# Patient Record
Sex: FEMALE | Race: OTHER | Employment: UNEMPLOYED | URBAN - METROPOLITAN AREA
[De-identification: names, ages, dates, MRNs, and addresses within clinical notes are randomized per-mention and may not be internally consistent; named-entity substitution may affect disease eponyms.]

---

## 2023-12-28 ENCOUNTER — ORDER TRANSCRIPTION (OUTPATIENT)
Dept: PHYSICAL THERAPY | Facility: HOSPITAL | Age: 63
End: 2023-12-28

## 2023-12-28 DIAGNOSIS — I69.30 HISTORY OF ISCHEMIC CEREBROVASCULAR ACCIDENT WITH RESIDUAL DEFICIT: Primary | ICD-10-CM

## 2023-12-28 DIAGNOSIS — I69.322 DYSARTHRIA AS LATE EFFECT OF CEREBELLAR CEREBROVASCULAR ACCIDENT (CVA): ICD-10-CM

## 2023-12-28 DIAGNOSIS — I69.30 HISTORY OF ISCHEMIC CEREBROVASCULAR ACCIDENT WITH RESIDUAL DEFICIT: ICD-10-CM

## 2023-12-28 DIAGNOSIS — R29.898 RIGHT ARM WEAKNESS: Primary | ICD-10-CM

## 2023-12-28 DIAGNOSIS — I69.322 DYSARTHRIA AS LATE EFFECT OF CEREBROVASCULAR ACCIDENT (CVA): ICD-10-CM

## 2024-01-03 ENCOUNTER — APPOINTMENT (OUTPATIENT)
Dept: CT IMAGING | Facility: HOSPITAL | Age: 64
End: 2024-01-03
Attending: NURSE PRACTITIONER
Payer: COMMERCIAL

## 2024-01-03 ENCOUNTER — HOSPITAL ENCOUNTER (OUTPATIENT)
Facility: HOSPITAL | Age: 64
Setting detail: OBSERVATION
Discharge: HOME | End: 2024-01-05
Attending: STUDENT IN AN ORGANIZED HEALTH CARE EDUCATION/TRAINING PROGRAM | Admitting: STUDENT IN AN ORGANIZED HEALTH CARE EDUCATION/TRAINING PROGRAM
Payer: COMMERCIAL

## 2024-01-03 DIAGNOSIS — R11.10 INTRACTABLE VOMITING: Primary | ICD-10-CM

## 2024-01-03 LAB
ALBUMIN SERPL-MCNC: 5 G/DL (ref 3.2–4.8)
ALBUMIN/GLOB SERPL: 1.2 {RATIO} (ref 1–2)
ALP LIVER SERPL-CCNC: 62 U/L
ALT SERPL-CCNC: 10 U/L
ANION GAP SERPL CALC-SCNC: 6 MMOL/L (ref 0–18)
AST SERPL-CCNC: 21 U/L (ref ?–34)
BASOPHILS # BLD AUTO: 0.03 X10(3) UL (ref 0–0.2)
BASOPHILS NFR BLD AUTO: 0.5 %
BILIRUB SERPL-MCNC: 0.6 MG/DL (ref 0.2–1.1)
BILIRUB UR QL: NEGATIVE
BUN BLD-MCNC: 17 MG/DL (ref 9–23)
BUN/CREAT SERPL: 23.3 (ref 10–20)
CALCIUM BLD-MCNC: 9.9 MG/DL (ref 8.7–10.4)
CHLORIDE SERPL-SCNC: 103 MMOL/L (ref 98–112)
CO2 SERPL-SCNC: 32 MMOL/L (ref 21–32)
CREAT BLD-MCNC: 0.73 MG/DL
DEPRECATED RDW RBC AUTO: 38.9 FL (ref 35.1–46.3)
EGFRCR SERPLBLD CKD-EPI 2021: 92 ML/MIN/1.73M2 (ref 60–?)
EOSINOPHIL # BLD AUTO: 0.07 X10(3) UL (ref 0–0.7)
EOSINOPHIL NFR BLD AUTO: 1.2 %
ERYTHROCYTE [DISTWIDTH] IN BLOOD BY AUTOMATED COUNT: 11.6 % (ref 11–15)
FLUAV + FLUBV RNA SPEC NAA+PROBE: NEGATIVE
FLUAV + FLUBV RNA SPEC NAA+PROBE: NEGATIVE
GLOBULIN PLAS-MCNC: 4.1 G/DL (ref 2.8–4.4)
GLUCOSE BLD-MCNC: 83 MG/DL (ref 70–99)
GLUCOSE UR-MCNC: >1000 MG/DL
HCT VFR BLD AUTO: 35.6 %
HGB BLD-MCNC: 12.2 G/DL
HGB UR QL STRIP.AUTO: NEGATIVE
IMM GRANULOCYTES # BLD AUTO: 0.02 X10(3) UL (ref 0–1)
IMM GRANULOCYTES NFR BLD: 0.3 %
KETONES UR-MCNC: 60 MG/DL
LEUKOCYTE ESTERASE UR QL STRIP.AUTO: 500
LIPASE SERPL-CCNC: 40 U/L (ref 13–75)
LYMPHOCYTES # BLD AUTO: 1.73 X10(3) UL (ref 1–4)
LYMPHOCYTES NFR BLD AUTO: 29.6 %
MCH RBC QN AUTO: 31.4 PG (ref 26–34)
MCHC RBC AUTO-ENTMCNC: 34.3 G/DL (ref 31–37)
MCV RBC AUTO: 91.8 FL
MONOCYTES # BLD AUTO: 0.46 X10(3) UL (ref 0.1–1)
MONOCYTES NFR BLD AUTO: 7.9 %
NEUTROPHILS # BLD AUTO: 3.54 X10 (3) UL (ref 1.5–7.7)
NEUTROPHILS # BLD AUTO: 3.54 X10(3) UL (ref 1.5–7.7)
NEUTROPHILS NFR BLD AUTO: 60.5 %
NITRITE UR QL STRIP.AUTO: NEGATIVE
OSMOLALITY SERPL CALC.SUM OF ELEC: 293 MOSM/KG (ref 275–295)
PH UR: 6.5 [PH] (ref 5–8)
PLATELET # BLD AUTO: 324 10(3)UL (ref 150–450)
POTASSIUM SERPL-SCNC: 3.1 MMOL/L (ref 3.5–5.1)
PROT SERPL-MCNC: 9.1 G/DL (ref 5.7–8.2)
PROT UR-MCNC: NEGATIVE MG/DL
RBC # BLD AUTO: 3.88 X10(6)UL
RSV RNA SPEC NAA+PROBE: NEGATIVE
SARS-COV-2 RNA RESP QL NAA+PROBE: NOT DETECTED
SODIUM SERPL-SCNC: 141 MMOL/L (ref 136–145)
SP GR UR STRIP: >1.03 (ref 1–1.03)
UROBILINOGEN UR STRIP-ACNC: NORMAL
WBC # BLD AUTO: 5.9 X10(3) UL (ref 4–11)

## 2024-01-03 PROCEDURE — 74177 CT ABD & PELVIS W/CONTRAST: CPT | Performed by: NURSE PRACTITIONER

## 2024-01-03 PROCEDURE — 99223 1ST HOSP IP/OBS HIGH 75: CPT | Performed by: HOSPITALIST

## 2024-01-03 RX ORDER — ONDANSETRON 2 MG/ML
4 INJECTION INTRAMUSCULAR; INTRAVENOUS ONCE
Status: COMPLETED | OUTPATIENT
Start: 2024-01-03 | End: 2024-01-03

## 2024-01-03 RX ORDER — ONDANSETRON 2 MG/ML
INJECTION INTRAMUSCULAR; INTRAVENOUS
Status: COMPLETED
Start: 2024-01-03 | End: 2024-01-03

## 2024-01-03 RX ORDER — MORPHINE SULFATE 4 MG/ML
4 INJECTION, SOLUTION INTRAMUSCULAR; INTRAVENOUS ONCE
Status: COMPLETED | OUTPATIENT
Start: 2024-01-03 | End: 2024-01-03

## 2024-01-03 RX ORDER — KETOROLAC TROMETHAMINE 15 MG/ML
15 INJECTION, SOLUTION INTRAMUSCULAR; INTRAVENOUS ONCE
Status: COMPLETED | OUTPATIENT
Start: 2024-01-03 | End: 2024-01-03

## 2024-01-04 LAB
ANION GAP SERPL CALC-SCNC: 7 MMOL/L (ref 0–18)
ATRIAL RATE: 54 BPM
BASOPHILS # BLD AUTO: 0.02 X10(3) UL (ref 0–0.2)
BASOPHILS NFR BLD AUTO: 0.3 %
BUN BLD-MCNC: 14 MG/DL (ref 9–23)
BUN/CREAT SERPL: 20.9 (ref 10–20)
CALCIUM BLD-MCNC: 8.8 MG/DL (ref 8.7–10.4)
CHLORIDE SERPL-SCNC: 108 MMOL/L (ref 98–112)
CO2 SERPL-SCNC: 25 MMOL/L (ref 21–32)
CREAT BLD-MCNC: 0.67 MG/DL
DEPRECATED RDW RBC AUTO: 39.4 FL (ref 35.1–46.3)
EGFRCR SERPLBLD CKD-EPI 2021: 98 ML/MIN/1.73M2 (ref 60–?)
EOSINOPHIL # BLD AUTO: 0 X10(3) UL (ref 0–0.7)
EOSINOPHIL NFR BLD AUTO: 0 %
ERYTHROCYTE [DISTWIDTH] IN BLOOD BY AUTOMATED COUNT: 11.5 % (ref 11–15)
EST. AVERAGE GLUCOSE BLD GHB EST-MCNC: 148 MG/DL (ref 68–126)
GLUCOSE BLD-MCNC: 114 MG/DL (ref 70–99)
GLUCOSE BLDC GLUCOMTR-MCNC: 102 MG/DL (ref 70–99)
GLUCOSE BLDC GLUCOMTR-MCNC: 138 MG/DL (ref 70–99)
GLUCOSE BLDC GLUCOMTR-MCNC: 155 MG/DL (ref 70–99)
GLUCOSE BLDC GLUCOMTR-MCNC: 211 MG/DL (ref 70–99)
GLUCOSE BLDC GLUCOMTR-MCNC: 70 MG/DL (ref 70–99)
HBA1C MFR BLD: 6.8 % (ref ?–5.7)
HCT VFR BLD AUTO: 33.8 %
HGB BLD-MCNC: 11 G/DL
IMM GRANULOCYTES # BLD AUTO: 0.03 X10(3) UL (ref 0–1)
IMM GRANULOCYTES NFR BLD: 0.4 %
LIPASE SERPL-CCNC: 34 U/L (ref 12–53)
LYMPHOCYTES # BLD AUTO: 1.01 X10(3) UL (ref 1–4)
LYMPHOCYTES NFR BLD AUTO: 14.5 %
MCH RBC QN AUTO: 30.7 PG (ref 26–34)
MCHC RBC AUTO-ENTMCNC: 32.5 G/DL (ref 31–37)
MCV RBC AUTO: 94.4 FL
MONOCYTES # BLD AUTO: 0.27 X10(3) UL (ref 0.1–1)
MONOCYTES NFR BLD AUTO: 3.9 %
NEUTROPHILS # BLD AUTO: 5.65 X10 (3) UL (ref 1.5–7.7)
NEUTROPHILS # BLD AUTO: 5.65 X10(3) UL (ref 1.5–7.7)
NEUTROPHILS NFR BLD AUTO: 80.9 %
OSMOLALITY SERPL CALC.SUM OF ELEC: 291 MOSM/KG (ref 275–295)
P AXIS: 42 DEGREES
P-R INTERVAL: 224 MS
PLATELET # BLD AUTO: 258 10(3)UL (ref 150–450)
POTASSIUM SERPL-SCNC: 3.5 MMOL/L (ref 3.5–5.1)
Q-T INTERVAL: 490 MS
QRS DURATION: 148 MS
QTC CALCULATION (BEZET): 464 MS
R AXIS: -61 DEGREES
RBC # BLD AUTO: 3.58 X10(6)UL
SODIUM SERPL-SCNC: 140 MMOL/L (ref 136–145)
T AXIS: 24 DEGREES
VENTRICULAR RATE: 54 BPM
WBC # BLD AUTO: 7 X10(3) UL (ref 4–11)

## 2024-01-04 PROCEDURE — 99233 SBSQ HOSP IP/OBS HIGH 50: CPT | Performed by: HOSPITALIST

## 2024-01-04 RX ORDER — ATORVASTATIN CALCIUM 40 MG/1
40 TABLET, FILM COATED ORAL NIGHTLY
Status: DISCONTINUED | OUTPATIENT
Start: 2024-01-05 | End: 2024-01-05

## 2024-01-04 RX ORDER — DEXTROSE MONOHYDRATE 25 G/50ML
50 INJECTION, SOLUTION INTRAVENOUS
Status: DISCONTINUED | OUTPATIENT
Start: 2024-01-04 | End: 2024-01-05

## 2024-01-04 RX ORDER — NICOTINE POLACRILEX 4 MG
30 LOZENGE BUCCAL
Status: DISCONTINUED | OUTPATIENT
Start: 2024-01-04 | End: 2024-01-05

## 2024-01-04 RX ORDER — MORPHINE SULFATE 2 MG/ML
2 INJECTION, SOLUTION INTRAMUSCULAR; INTRAVENOUS EVERY 2 HOUR PRN
Status: DISCONTINUED | OUTPATIENT
Start: 2024-01-04 | End: 2024-01-05

## 2024-01-04 RX ORDER — ASPIRIN 81 MG/1
81 TABLET ORAL DAILY
Status: DISCONTINUED | OUTPATIENT
Start: 2024-01-04 | End: 2024-01-05

## 2024-01-04 RX ORDER — ONDANSETRON 2 MG/ML
4 INJECTION INTRAMUSCULAR; INTRAVENOUS EVERY 6 HOURS PRN
Status: DISCONTINUED | OUTPATIENT
Start: 2024-01-04 | End: 2024-01-05

## 2024-01-04 RX ORDER — NICOTINE POLACRILEX 4 MG
15 LOZENGE BUCCAL
Status: DISCONTINUED | OUTPATIENT
Start: 2024-01-04 | End: 2024-01-05

## 2024-01-04 RX ORDER — HEPARIN SODIUM 5000 [USP'U]/ML
5000 INJECTION, SOLUTION INTRAVENOUS; SUBCUTANEOUS EVERY 12 HOURS
Status: DISCONTINUED | OUTPATIENT
Start: 2024-01-04 | End: 2024-01-05

## 2024-01-04 RX ORDER — MORPHINE SULFATE 4 MG/ML
4 INJECTION, SOLUTION INTRAMUSCULAR; INTRAVENOUS EVERY 2 HOUR PRN
Status: DISCONTINUED | OUTPATIENT
Start: 2024-01-04 | End: 2024-01-05

## 2024-01-04 RX ORDER — ZOLPIDEM TARTRATE 5 MG/1
5 TABLET ORAL NIGHTLY PRN
Status: DISCONTINUED | OUTPATIENT
Start: 2024-01-04 | End: 2024-01-05

## 2024-01-04 RX ORDER — SODIUM CHLORIDE AND POTASSIUM CHLORIDE 150; 900 MG/100ML; MG/100ML
INJECTION, SOLUTION INTRAVENOUS CONTINUOUS
Status: DISCONTINUED | OUTPATIENT
Start: 2024-01-04 | End: 2024-01-05

## 2024-01-04 RX ORDER — CLOPIDOGREL BISULFATE 75 MG/1
75 TABLET ORAL DAILY
Status: DISCONTINUED | OUTPATIENT
Start: 2024-01-04 | End: 2024-01-05

## 2024-01-04 RX ORDER — LOSARTAN POTASSIUM 100 MG/1
100 TABLET ORAL DAILY
Status: DISCONTINUED | OUTPATIENT
Start: 2024-01-04 | End: 2024-01-05

## 2024-01-04 RX ORDER — DEXTROSE MONOHYDRATE, SODIUM CHLORIDE, AND POTASSIUM CHLORIDE 50; 1.49; 4.5 G/1000ML; G/1000ML; G/1000ML
INJECTION, SOLUTION INTRAVENOUS
Status: DISCONTINUED
Start: 2024-01-04 | End: 2024-01-04

## 2024-01-04 RX ORDER — METOCLOPRAMIDE HYDROCHLORIDE 5 MG/ML
10 INJECTION INTRAMUSCULAR; INTRAVENOUS EVERY 6 HOURS PRN
Status: DISCONTINUED | OUTPATIENT
Start: 2024-01-04 | End: 2024-01-05

## 2024-01-04 RX ORDER — HALOPERIDOL 5 MG/ML
2 INJECTION INTRAMUSCULAR EVERY 6 HOURS PRN
Status: DISCONTINUED | OUTPATIENT
Start: 2024-01-04 | End: 2024-01-05

## 2024-01-04 RX ORDER — ACETAMINOPHEN 325 MG/1
650 TABLET ORAL EVERY 6 HOURS PRN
Status: DISCONTINUED | OUTPATIENT
Start: 2024-01-04 | End: 2024-01-05

## 2024-01-04 RX ORDER — AMLODIPINE BESYLATE 10 MG/1
10 TABLET ORAL DAILY
Status: DISCONTINUED | OUTPATIENT
Start: 2024-01-04 | End: 2024-01-05

## 2024-01-04 RX ORDER — MAGNESIUM HYDROXIDE/ALUMINUM HYDROXICE/SIMETHICONE 120; 1200; 1200 MG/30ML; MG/30ML; MG/30ML
30 SUSPENSION ORAL 4 TIMES DAILY PRN
Status: DISCONTINUED | OUTPATIENT
Start: 2024-01-04 | End: 2024-01-05

## 2024-01-04 RX ORDER — SODIUM CHLORIDE 9 MG/ML
INJECTION, SOLUTION INTRAVENOUS CONTINUOUS
Status: ACTIVE | OUTPATIENT
Start: 2024-01-04 | End: 2024-01-04

## 2024-01-04 NOTE — ED QUICK NOTES
Orders for admission, patient is aware of plan and ready to go upstairs. Any questions, please call ED JIMBO Young at extension [phone].     Patient Covid vaccination status: Fully vaccinated     COVID Test Ordered in ED: SARS-CoV-2/Flu A and B/RSV by PCR ([facility])    COVID Suspicion at Admission: N/A    Running Infusions:  None    Mental Status/LOC at time of transport: A&Ox4, primarily Turkmen speaking    Other pertinent information: patient has a hx of stroke in June of 2023 with left sided deficit and speech problems.  CIWA score: N/A   NIH score:  N/A

## 2024-01-04 NOTE — ED QUICK NOTES
This RN received report from Hannah PATEL RN.    Rounding Completed    Plan of Care reviewed. Waiting for inpatient admission.  Elimination needs assessed.  Patient resting in bed, alert to baseline per family, hx stroke L defecits. Pt's VS assessed at time of rounding. No new requests at this time.  Respiratory effort good, even and unlabored. IV flushes and draws with ease    Bed is locked and in lowest position. Call light within reach.

## 2024-01-04 NOTE — H&P
[facility]    History & Physical    Mitch Wick Patient Status:  Emergency    1960 MRN [id number]   Location [facility] EMERGENCY DEPARTMENT Attending No att. providers found   Hosp Day # 0 PCP No primary care provider on file.     Date:  1/3/2024  Date of Admission:  1/3/2024    Chief Complaint:  Chief Complaint   Patient presents with    Vomiting    Abdominal Pain       History of Present Illness:  Mitch Wick is a(n) 63 year old female, with a past medical history significant for hypertension, CVA with right-sided deficit and dysarthria presents with complaint of intractable nausea vomiting and headache ongoing for the past week.  Describes the onset as gradual with progressive worsening aggravated by food with no relieving factors.  Claims she has barely able to even drink fluids without throwing up.  Also complains of occasional periods of dizziness.  Denies any diarrhea or constipation.  History limited due to patient's severe dysarthria    History:  Past Medical History:   Diagnosis Date    Essential hypertension      History reviewed. No pertinent surgical history.  Family history: No sick contacts   reports that she has never smoked. She has never used smokeless tobacco. She reports that she does not drink alcohol and does not use drugs.    Allergies:  No Known Allergies    Home Medications:  None       Review of Systems:  Constitutional:  Weakness, Fatigue.  Eye:  Negative.  Ear/Nose/Mouth/Throat:  Negative.  Respiratory:  Negative  Cardiovascular: Negative  Gastrointestinal: Nausea vomiting  Genitourinary:  Negative  Endocrine:  Negative.  Immunologic:  Negative.  Musculoskeletal:  Negative.  Integumentary:  Negative.  Neurologic: Headache  Psychiatric:  Negative.  ROS reviewed as documented in chart    Physical Exam:  Temp:  [97.8 °F (36.6 °C)] 97.8 °F (36.6 °C)  Pulse:  [57-58] 58  Resp:  [19-20] 20  BP: (121-145)/(55-78) 121/55  SpO2:  [99 %-100 %] 100 %    General:   Alert and oriented.  Diffuse skin problem:  None.  Eye:  Pupils are equal, round and reactive to light, extraocular movements are intact, Normal conjunctiva.  HENT:  Normocephalic, oral mucosa is moist.  Head:  Normocephalic, atraumatic.  Neck:  Supple, non-tender, no carotid bruit, no jugular venous distention, no lymphadenopathy, no thyromegaly.  Respiratory:  Lungs are clear to auscultation, respirations are non-labored, breath sounds are equal, symmetrical chest wall expansion.  Cardiovascular:  Normal rate, regular rhythm, no murmur, no edema.  Gastrointestinal:  Soft, non-tender, non-distended, normal bowel sounds, no organomegaly.  Lymphatics:  No lymphadenopathy neck, axilla, groin.  Musculoskeletal: 2 out of 5 strength in right upper extremity  Feet:  Normal pulses.  Neurologic:  Alert, oriented, right-sided weakness, facial droop  Cognition and Speech: Dysarthria  Psychiatric:  Cooperative, appropriate mood & affect.      Laboratory Data:   Lab Results   Component Value Date    WBC 5.9 01/03/2024    HGB 12.2 01/03/2024    HCT 35.6 01/03/2024    .0 01/03/2024    CREATSERUM 0.73 01/03/2024    BUN 17 01/03/2024     01/03/2024    K 3.1 01/03/2024     01/03/2024    CO2 32.0 01/03/2024    GLU 83 01/03/2024    CA 9.9 01/03/2024    ALB 5.0 01/03/2024    ALKPHO 62 01/03/2024    BILT 0.6 01/03/2024    TP 9.1 01/03/2024    AST 21 01/03/2024    ALT 10 01/03/2024    LIP 40 01/03/2024       Imaging:  CT ABDOMEN PELVIS IV CONTRAST, NO ORAL (ER)    Result Date: 1/3/2024  CONCLUSION:   1. No bowel obstruction, acute appendicitis, acute diverticulitis, or abnormal bowel wall thickening. 2. Hepatic steatosis. 3. No hydronephrosis or renal calculus. 4. Mild-to-moderate atherosclerotic calcification of the abdominal aorta and its branching vessels, which are described in detail above. 5. Mild cardiomegaly with coronary artery calcifications.     Dictated by (CST): Tristen St MD on 1/03/2024 at 9:42 PM      Finalized by (CST): Tristen St MD on 1/03/2024 at 9:48 PM            Assessment and Plan:    Intractable nausea vomiting  Likely secondary to acute gastritis, will start patient on Protonix 40 mg IV daily, use Zofran/Reglan as needed for nausea.  IV fluids initiated, attempt dietary challenge with clear liquids in AM, advance as tolerated.  If symptoms persist consider MRI to rule out cerebellar stroke    Acute UTI  Patient started on Rocephin 1 g IVPB every 24 hours, cultures pending.  Will continue to monitor.    CVA  Continue aspirin statin and Plavix    Uncontrolled hypertension  Questionable compliance particularly in view of severe nausea, will use IV hydralazine/labetalol as needed for uncontrolled pressures, resume home meds.    Hypokalemia  Secondary to repeated emesis, initiate electrolyte replacement protocol, will supplement potassium with IV fluids as well.    Prophylaxis  Subcutaneous heparin    CODE STATUS  Full    Primary care physician  No primary care provider on file.    MDM: High, severe exacerbation of chronic illness posing threat to life.  IV medications requiring close inpatient monitoring  75 minutes spent on this admission - examining patient, obtaining history, reviewing previous medical records, going over test results/imaging and discussing plan of care. All questions answered.     Disposition  Clinical course will dictate outcome      HARPAL YE MD  1/3/2024  10:17 PM

## 2024-01-04 NOTE — ED PROVIDER NOTES
Patient Seen in: [facility] Emergency Department      History     Chief Complaint   Patient presents with    Vomiting    Abdominal Pain     Stated Complaint: abd pain, vomiting    Subjective:   64yo/f w hx of CVA last year with right sided deficits, HTN reports with two days of left sided abdominal pain. Sharp, constant, worse w time. Associated nausea, vomiting. Unable to tolerate any po intake. No cough, congestion. No urinary symptoms. No head, neck, back pain. No weakness or lethargy. No trouble breathing/speaking/swallowing. No blood in stool.             Objective:   Past Medical History:   Diagnosis Date    Essential hypertension               History reviewed. No pertinent surgical history.             Social History     Socioeconomic History    Marital status:    Tobacco Use    Smoking status: Never    Smokeless tobacco: Never   Vaping Use    Vaping Use: Never used   Substance and Sexual Activity    Alcohol use: Never    Drug use: Never              Review of Systems   All other systems reviewed and are negative.      Positive for stated complaint: abd pain, vomiting  Other systems are as noted in HPI.  Constitutional and vital signs reviewed.      All other systems reviewed and negative except as noted above.    Physical Exam     ED Triage Vitals   BP 01/03/24 1648 145/78   Pulse 01/03/24 1648 57   Resp 01/03/24 1648 19   Temp 01/03/24 1648 97.8 °F (36.6 °C)   Temp src --    SpO2 01/03/24 1648 99 %   O2 Device 01/03/24 2021 None (Room air)       Current:/55   Pulse 58   Temp 97.8 °F (36.6 °C)   Resp 20   Ht 154.9 cm (5' 1\")   Wt 54.4 kg   SpO2 100%   BMI 22.67 kg/m²         Physical Exam  Vitals and nursing note reviewed.   Constitutional:       General: She is not in acute distress.     Appearance: She is well-developed.   HENT:      Head: Normocephalic and atraumatic.      Nose: Nose normal.      Mouth/Throat:      Mouth: Mucous membranes are moist.   Eyes:       Conjunctiva/sclera: Conjunctivae normal.      Pupils: Pupils are equal, round, and reactive to light.   Cardiovascular:      Rate and Rhythm: Normal rate and regular rhythm.      Heart sounds: Normal heart sounds.   Pulmonary:      Effort: Pulmonary effort is normal.      Breath sounds: Normal breath sounds.   Abdominal:      General: Bowel sounds are normal.      Palpations: Abdomen is soft.   Musculoskeletal:         General: No tenderness or deformity. Normal range of motion.      Cervical back: Normal range of motion and neck supple.   Skin:     General: Skin is warm and dry.      Capillary Refill: Capillary refill takes less than 2 seconds.      Findings: No rash.      Comments: Normal color   Neurological:      General: No focal deficit present.      Mental Status: She is alert and oriented to person, place, and time.      GCS: GCS eye subscore is 4. GCS verbal subscore is 5. GCS motor subscore is 6.      Cranial Nerves: No cranial nerve deficit.      Gait: Gait normal.           ED Course     Labs Reviewed   COMP METABOLIC PANEL (14) - Abnormal; Notable for the following components:       Result Value    Potassium 3.1 (*)     BUN/CREA Ratio 23.3 (*)     Total Protein 9.1 (*)     Albumin 5.0 (*)     All other components within normal limits   LIPASE - Normal   CBC WITH DIFFERENTIAL WITH PLATELET    Narrative:     The following orders were created for panel order CBC With Differential With Platelet.  Procedure                               Abnormality         Status                     ---------                               -----------         ------                     CBC W/ DIFFERENTIAL[[id number]]                              Final result                 Please view results for these tests on the individual orders.   URINALYSIS, ROUTINE   SARS-COV-2/FLU A AND B/RSV BY PCR (GENEXPERT)   CBC W/ DIFFERENTIAL     EKG    Rate, intervals and axes as noted on EKG Report.  Rate: 54   Rhythm: Sinus Rhythm  Reading:  SB no fazal no ectopy normal axis  Stable clinical condition  No comparison                   Impression  CONCLUSION:     1. No bowel obstruction, acute appendicitis, acute diverticulitis, or abnormal bowel wall thickening.  2. Hepatic steatosis.  3. No hydronephrosis or renal calculus.  4. Mild-to-moderate atherosclerotic calcification of the abdominal aorta and its branching vessels, which are described in detail above.  5. Mild cardiomegaly with coronary artery calcifications.             Dictated by (CST): Tristen St MD on 1/03/2024 at 9:42 PM      Finalized by (CST): Tristen St MD on 1/03/2024 at 9:48 PM             [facility]        Admission disposition: 1/3/2024 10:08 PM                                        Medical Decision Making  64yo/f w hx and exam as stated, abd pain vomiting    Zofran x 2, reglan, fluids, still vomiting  No chest pain  ?dysphagia from CVA, ?EGD eval coming up, story unclear/inconsistent  Pain despite meds  No fever  No urinary symptoms  Overall stable    Plan  Admit to obs for intractable vomiting  Discussed w Dr. Yeung, will admit      Amount and/or Complexity of Data Reviewed  Labs:  Decision-making details documented in ED Course.  Radiology:  Decision-making details documented in ED Course.    Risk  OTC drugs.  Prescription drug management.  Decision regarding hospitalization.        Disposition and Plan     Clinical Impression:  1. Intractable vomiting         Disposition:  Admit  1/3/2024 10:08 pm    Follow-up:  No follow-up provider specified.        Medications Prescribed:  There are no discharge medications for this patient.                        Hospital Problems       Present on Admission             ICD-10-CM Noted POA    * (Principal) Intractable vomiting R11.10 1/3/2024 Unknown

## 2024-01-04 NOTE — PLAN OF CARE
Patient is Aox4 on RA. ED admit for intractable vomiting. Romansh/English sp. SBA. R side weakness from Hx of CVA. Heparin and SCDs. Patient has order for tele - no boxes available. Clear liquid diet. Complaints of nausea - Zofran given, seem to be improved. LBM 1/2/23. IVF infusing. Tylenol given for complaints of headache. Plan pending.       Problem: Patient Centered Care  Goal: Patient preferences are identified and integrated in the patient's plan of care  Description: Interventions:  - What would you like us to know as we care for you? I live home with my   - Provide timely, complete, and accurate information to patient/family  - Incorporate patient and family knowledge, values, beliefs, and cultural backgrounds into the planning and delivery of care  - Encourage patient/family to participate in care and decision-making at the level they choose  - Honor patient and family perspectives and choices  1/4/2024 0403 by Haydee Flores, RN  Outcome: Progressing  1/4/2024 0403 by Haydee Flores RN  Outcome: Progressing     Problem: PAIN - ADULT  Goal: Verbalizes/displays adequate comfort level or patient's stated pain goal  Description: INTERVENTIONS:  - Encourage pt to monitor pain and request assistance  - Assess pain using appropriate pain scale  - Administer analgesics based on type and severity of pain and evaluate response  - Implement non-pharmacological measures as appropriate and evaluate response  - Consider cultural and social influences on pain and pain management  - Manage/alleviate anxiety  - Utilize distraction and/or relaxation techniques  - Monitor for opioid side effects  - Notify MD/LIP if interventions unsuccessful or patient reports new pain  - Anticipate increased pain with activity and pre-medicate as appropriate  Outcome: Progressing     Problem: SAFETY ADULT - FALL  Goal: Free from fall injury  Description: INTERVENTIONS:  - Assess pt frequently for physical needs  - Identify  cognitive and physical deficits and behaviors that affect risk of falls.  - [place] fall precautions as indicated by assessment.  - Educate pt/family on patient safety including physical limitations  - Instruct pt to call for assistance with activity based on assessment  - Modify environment to reduce risk of injury  - Provide assistive devices as appropriate  - Consider OT/PT consult to assist with strengthening/mobility  - Encourage toileting schedule  Outcome: Progressing     Problem: DISCHARGE PLANNING  Goal: Discharge to home or other facility with appropriate resources  Description: INTERVENTIONS:  - Identify barriers to discharge w/pt and caregiver  - Include patient/family/discharge partner in discharge planning  - Arrange for needed discharge resources and transportation as appropriate  - Identify discharge learning needs (meds, wound care, etc)  - Arrange for interpreters to assist at discharge as needed  - Consider post-discharge preferences of patient/family/discharge partner  - Complete POLST form as appropriate  - Assess patient's ability to be responsible for managing their own health  - Refer to Case Management Department for coordinating discharge planning if the patient needs post-hospital services based on physician/LIP order or complex needs related to functional status, cognitive ability or social support system  Outcome: Progressing     Problem: Altered Communication/Language Barrier  Goal: Patient/Family is able to understand and participate in their care  Description: Interventions:  - Assess communication ability and preferred communication style  - Implement communication aides and strategies  - Use visual cues when possible  - Listen attentively, be patient, do not interrupt  - Minimize distractions  - Allow time for understanding and response  - Establish method for patient to ask for assistance (call light)  - Provide an  as needed  - Communicate barriers and strategies to  overcome with those who interact with patient  Outcome: Progressing

## 2024-01-04 NOTE — PROGRESS NOTES
[facility]    Progress Note    Mitch Wick Patient Status:  Observation    1960 MRN [id number]   Location [facility] 4W/SW/SE Attending Bill Oshea MD   Hosp Day # 0 PCP No primary care provider on file.     Chief Complaint:     Intractable Vomiting.     Subjective:   Subjective:    Patient seen and examined this morning  Continues to have n/v.   Afebrile, normotensive.     Objective:   Blood pressure 120/52, pulse 63, temperature 98.7 °F (37.1 °C), temperature source Oral, resp. rate 16, height 5' 1\" (1.549 m), weight 122 lb 3.2 oz (55.4 kg), SpO2 97%.  Physical Exam    General:  Alert and oriented.  Diffuse skin problem:  None.  Eye:  Pupils are equal, round and reactive to light, extraocular movements are intact, Normal conjunctiva.  HENT:  Normocephalic, oral mucosa is moist.  Head:  Normocephalic, atraumatic.  Neck:  Supple, non-tender, no carotid bruit, no jugular venous distention, no lymphadenopathy, no thyromegaly.  Respiratory:  Lungs are clear to auscultation, respirations are non-labored, breath sounds are equal, symmetrical chest wall expansion.  Cardiovascular:  Normal rate, regular rhythm, no murmur, no edema.  Gastrointestinal:  Soft, non-tender, non-distended, normal bowel sounds, no organomegaly.  Lymphatics:  No lymphadenopathy neck, axilla, groin.  Musculoskeletal: 2 out of 5 strength in right upper extremity  Feet:  Normal pulses.  Neurologic:  Alert, oriented, right-sided weakness, facial droop  Cognition and Speech: Dysarthria  Psychiatric:  Cooperative, appropriate mood & affect.    Results:   Lab Results   Component Value Date    WBC 7.0 2024    HGB 11.0 (L) 2024    HCT 33.8 (L) 2024    .0 2024    CREATSERUM 0.67 2024    BUN 14 2024     2024    K 3.5 2024     2024    CO2 25.0 2024     (H) 2024    CA 8.8 2024    ALB 5.0 (H) 2024    JAKE Suarez  01/03/2024    BILT 0.6 01/03/2024    TP 9.1 (H) 01/03/2024    AST 21 01/03/2024    ALT 10 01/03/2024    LIP 34 01/04/2024       CT ABDOMEN PELVIS IV CONTRAST, NO ORAL (ER)    Result Date: 1/3/2024  CONCLUSION:   1. No bowel obstruction, acute appendicitis, acute diverticulitis, or abnormal bowel wall thickening. 2. Hepatic steatosis. 3. No hydronephrosis or renal calculus. 4. Mild-to-moderate atherosclerotic calcification of the abdominal aorta and its branching vessels, which are described in detail above. 5. Mild cardiomegaly with coronary artery calcifications.     Dictated by (CST): Tristen St MD on 1/03/2024 at 9:42 PM     Finalized by (CST): Tristen St MD on 1/03/2024 at 9:48 PM         EKG 12 Lead    Result Date: 1/4/2024  Sinus bradycardia with 1st degree A-V block Left axis deviation Right bundle branch block Abnormal ECG No previous ECGs found in [facility] Confirmed by LINDA SYKES, FRANCIS ([id number]) on 1/4/2024 9:33:46 AM     Assessment & Plan:       Intractable nausea vomiting  Likely secondary to acute gastritis, will start patient on Protonix 40 mg IV daily, use Zofran/Reglan as needed for nausea.  IV fluids initiated, attempt dietary challenge with clear liquids in AM, advance as tolerated.  If symptoms persist consider MRI to rule out cerebellar stroke     Acute UTI  Patient started on Rocephin 1 g IVPB every 24 hours, cultures pending.  Will continue to monitor.     CVA  Continue aspirin statin and Plavix     Uncontrolled hypertension  Questionable compliance particularly in view of severe nausea, will use IV hydralazine/labetalol as needed for uncontrolled pressures, resume home meds.     Hypokalemia  Secondary to repeated emesis, initiate electrolyte replacement protocol, will supplement potassium with IV fluids as well.     Prophylaxis  Subcutaneous heparin     CODE STATUS  Full    Global A/P  -Reviewed previous consultant notes  -Reviewed CBC, BMP, Mag, and Phos  -Reviewed tests  ordered  -Repeat labs in am  -MDM: High, severe exacerbation of chronic illness posing a threat to life. IV medications requiring close inpatient monitoring.           Bill Oshea MD  1/4/2024

## 2024-01-04 NOTE — PLAN OF CARE
Mitch is from home with her spouse. Alert and oriented x 4 . Luxembourgish/english speaking-declines translation cart. Admitted with intractable vomiting. Ct abd/pelvis completed-see report. Ac. glucose monitoring-dr ocasio asked at nurses station and thru perfect serve whether a sliding scale was indicated-will f/u as needed. Ivf, iv rocephin-ua collected awaiting culture, afebrile, voiding, lbm 1/2, diet advanced as tolerated-full liquids at this time-reports intermittent nausea-scheduled ppi(protnoix), and prn zofran, denies need for pain med, oob with sba-safety intact. Repeat labs in am & f/u urine cx  Problem: Patient Centered Care  Goal: Patient preferences are identified and integrated in the patient's plan of care  Description: Interventions:  - What would you like us to know as we care for you? My native language is Ecuadorean  - Provide timely, complete, and accurate information to patient/family  - Incorporate patient and family knowledge, values, beliefs, and cultural backgrounds into the planning and delivery of care  - Encourage patient/family to participate in care and decision-making at the level they choose  - Honor patient and family perspectives and choices  Outcome: Progressing     Problem: Patient/Family Goals  Goal: Patient/Family Long Term Goal  Description: Patient's Long Term Goal: return home and to baseline adls    Interventions:  - mobilize  Pain control  Anti-emetics  Gi assessment  - See additional Care Plan goals for specific interventions  Outcome: Progressing  Goal: Patient/Family Short Term Goal  Description: Patient's Short Term Goal: marco a prescribed diet    Interventions:   - PPI/anti emetics  Gi assessment  - See additional Care Plan goals for specific interventions  Outcome: Progressing     Problem: PAIN - ADULT  Goal: Verbalizes/displays adequate comfort level or patient's stated pain goal  Description: INTERVENTIONS:  - Encourage pt to monitor pain and request assistance  -  Assess pain using appropriate pain scale  - Administer analgesics based on type and severity of pain and evaluate response  - Implement non-pharmacological measures as appropriate and evaluate response  - Consider cultural and social influences on pain and pain management  - Manage/alleviate anxiety  - Utilize distraction and/or relaxation techniques  - Monitor for opioid side effects  - Notify MD/LIP if interventions unsuccessful or patient reports new pain  - Anticipate increased pain with activity and pre-medicate as appropriate  Outcome: Progressing     Problem: SAFETY ADULT - FALL  Goal: Free from fall injury  Description: INTERVENTIONS:  - Assess pt frequently for physical needs  - Identify cognitive and physical deficits and behaviors that affect risk of falls.  - [place] fall precautions as indicated by assessment.  - Educate pt/family on patient safety including physical limitations  - Instruct pt to call for assistance with activity based on assessment  - Modify environment to reduce risk of injury  - Provide assistive devices as appropriate  - Consider OT/PT consult to assist with strengthening/mobility  - Encourage toileting schedule  Outcome: Progressing     Problem: DISCHARGE PLANNING  Goal: Discharge to home or other facility with appropriate resources  Description: INTERVENTIONS:  - Identify barriers to discharge w/pt and caregiver  - Include patient/family/discharge partner in discharge planning  - Arrange for needed discharge resources and transportation as appropriate  - Identify discharge learning needs (meds, wound care, etc)  - Arrange for interpreters to assist at discharge as needed  - Consider post-discharge preferences of patient/family/discharge partner  - Complete POLST form as appropriate  - Assess patient's ability to be responsible for managing their own health  - Refer to Case Management Department for coordinating discharge planning if the patient needs post-hospital services based  on physician/LIP order or complex needs related to functional status, cognitive ability or social support system  Outcome: Progressing     Problem: Altered Communication/Language Barrier  Goal: Patient/Family is able to understand and participate in their care  Description: Interventions:  - Assess communication ability and preferred communication style  - Implement communication aides and strategies  - Use visual cues when possible  - Listen attentively, be patient, do not interrupt  - Minimize distractions  - Allow time for understanding and response  - Establish method for patient to ask for assistance (call light)  - Provide an  as needed  - Communicate barriers and strategies to overcome with those who interact with patient  Outcome: Progressing

## 2024-01-05 VITALS
OXYGEN SATURATION: 98 % | TEMPERATURE: 99 F | BODY MASS INDEX: 23.07 KG/M2 | RESPIRATION RATE: 16 BRPM | DIASTOLIC BLOOD PRESSURE: 62 MMHG | WEIGHT: 122.19 LBS | HEART RATE: 60 BPM | SYSTOLIC BLOOD PRESSURE: 136 MMHG | HEIGHT: 61 IN

## 2024-01-05 LAB
ANION GAP SERPL CALC-SCNC: 7 MMOL/L (ref 0–18)
BASOPHILS # BLD AUTO: 0.02 X10(3) UL (ref 0–0.2)
BASOPHILS NFR BLD AUTO: 0.4 %
BUN BLD-MCNC: 6 MG/DL (ref 9–23)
BUN/CREAT SERPL: 10.9 (ref 10–20)
CALCIUM BLD-MCNC: 8.4 MG/DL (ref 8.7–10.4)
CHLORIDE SERPL-SCNC: 112 MMOL/L (ref 98–112)
CO2 SERPL-SCNC: 24 MMOL/L (ref 21–32)
CREAT BLD-MCNC: 0.55 MG/DL
DEPRECATED RDW RBC AUTO: 39.4 FL (ref 35.1–46.3)
EGFRCR SERPLBLD CKD-EPI 2021: 103 ML/MIN/1.73M2 (ref 60–?)
EOSINOPHIL # BLD AUTO: 0.1 X10(3) UL (ref 0–0.7)
EOSINOPHIL NFR BLD AUTO: 2.2 %
ERYTHROCYTE [DISTWIDTH] IN BLOOD BY AUTOMATED COUNT: 11.6 % (ref 11–15)
GLUCOSE BLD-MCNC: 88 MG/DL (ref 70–99)
GLUCOSE BLDC GLUCOMTR-MCNC: 90 MG/DL (ref 70–99)
GLUCOSE BLDC GLUCOMTR-MCNC: 98 MG/DL (ref 70–99)
HCT VFR BLD AUTO: 31.5 %
HGB BLD-MCNC: 10.4 G/DL
IMM GRANULOCYTES # BLD AUTO: 0.01 X10(3) UL (ref 0–1)
IMM GRANULOCYTES NFR BLD: 0.2 %
LYMPHOCYTES # BLD AUTO: 1.55 X10(3) UL (ref 1–4)
LYMPHOCYTES NFR BLD AUTO: 34.5 %
MAGNESIUM SERPL-MCNC: 1.6 MG/DL (ref 1.6–2.6)
MCH RBC QN AUTO: 30.8 PG (ref 26–34)
MCHC RBC AUTO-ENTMCNC: 33 G/DL (ref 31–37)
MCV RBC AUTO: 93.2 FL
MONOCYTES # BLD AUTO: 0.38 X10(3) UL (ref 0.1–1)
MONOCYTES NFR BLD AUTO: 8.5 %
NEUTROPHILS # BLD AUTO: 2.43 X10 (3) UL (ref 1.5–7.7)
NEUTROPHILS # BLD AUTO: 2.43 X10(3) UL (ref 1.5–7.7)
NEUTROPHILS NFR BLD AUTO: 54.2 %
OSMOLALITY SERPL CALC.SUM OF ELEC: 293 MOSM/KG (ref 275–295)
PHOSPHATE SERPL-MCNC: 1.9 MG/DL (ref 2.4–5.1)
PLATELET # BLD AUTO: 255 10(3)UL (ref 150–450)
POTASSIUM SERPL-SCNC: 3.4 MMOL/L (ref 3.5–5.1)
RBC # BLD AUTO: 3.38 X10(6)UL
SODIUM SERPL-SCNC: 143 MMOL/L (ref 136–145)
WBC # BLD AUTO: 4.5 X10(3) UL (ref 4–11)

## 2024-01-05 PROCEDURE — 99239 HOSP IP/OBS DSCHRG MGMT >30: CPT | Performed by: HOSPITALIST

## 2024-01-05 RX ORDER — ATORVASTATIN CALCIUM 40 MG/1
40 TABLET, FILM COATED ORAL NIGHTLY
Qty: 30 TABLET | Refills: 0 | Status: SHIPPED | OUTPATIENT
Start: 2024-01-05

## 2024-01-05 RX ORDER — CEFUROXIME AXETIL 500 MG/1
500 TABLET ORAL 2 TIMES DAILY
Qty: 10 TABLET | Refills: 0 | Status: SHIPPED | OUTPATIENT
Start: 2024-01-05 | End: 2024-01-10

## 2024-01-05 RX ORDER — CLOPIDOGREL BISULFATE 75 MG/1
75 TABLET ORAL DAILY
Qty: 30 TABLET | Refills: 0 | Status: SHIPPED | OUTPATIENT
Start: 2024-01-06

## 2024-01-05 RX ORDER — AMLODIPINE BESYLATE 10 MG/1
10 TABLET ORAL DAILY
Qty: 30 TABLET | Refills: 0 | Status: SHIPPED | OUTPATIENT
Start: 2024-01-06

## 2024-01-05 RX ORDER — MAGNESIUM OXIDE 400 MG/1
400 TABLET ORAL ONCE
Status: COMPLETED | OUTPATIENT
Start: 2024-01-05 | End: 2024-01-05

## 2024-01-05 RX ORDER — POTASSIUM CHLORIDE 14.9 MG/ML
20 INJECTION INTRAVENOUS ONCE
Status: DISCONTINUED | OUTPATIENT
Start: 2024-01-05 | End: 2024-01-05

## 2024-01-05 RX ORDER — POTASSIUM CHLORIDE 20 MEQ/1
20 TABLET, EXTENDED RELEASE ORAL DAILY
Status: COMPLETED | OUTPATIENT
Start: 2024-01-05 | End: 2024-01-05

## 2024-01-05 RX ORDER — ASPIRIN 81 MG/1
81 TABLET ORAL DAILY
Qty: 30 TABLET | Refills: 0 | Status: SHIPPED | OUTPATIENT
Start: 2024-01-06

## 2024-01-05 RX ORDER — LOSARTAN POTASSIUM 100 MG/1
100 TABLET ORAL DAILY
Qty: 30 TABLET | Refills: 0 | Status: SHIPPED | OUTPATIENT
Start: 2024-01-06

## 2024-01-05 NOTE — DISCHARGE SUMMARY
[facility]     Discharge Summary    Mitch Wick Patient Status:  Observation    1960 MRN [id number]   Location [facility] 4W/SW/SE Attending Bill Oshea MD   Hosp Day # 0 PCP No primary care provider on file.     Date of Admission: 1/3/2024    Date of Discharge: 2024.    Admitting Diagnosis: Intractable vomiting [R11.10]    Discharge Diagnosis:   Patient Active Problem List   Diagnosis    Intractable vomiting       Reason for Admission:     Intractable vomiting     Physical Exam:   General:  Alert and oriented.  Diffuse skin problem:  None.  Eye:  Pupils are equal, round and reactive to light, extraocular movements are intact, Normal conjunctiva.  HENT:  Normocephalic, oral mucosa is moist.  Head:  Normocephalic, atraumatic.  Neck:  Supple, non-tender, no carotid bruit, no jugular venous distention, no lymphadenopathy, no thyromegaly.  Respiratory:  Lungs are clear to auscultation, respirations are non-labored, breath sounds are equal, symmetrical chest wall expansion.  Cardiovascular:  Normal rate, regular rhythm, no murmur, no edema.  Gastrointestinal:  Soft, non-tender, non-distended, normal bowel sounds, no organomegaly.  Lymphatics:  No lymphadenopathy neck, axilla, groin.  Musculoskeletal: 2 out of 5 strength in right upper extremity  Feet:  Normal pulses.  Neurologic:  Alert, oriented, right-sided weakness, facial droop  Cognition and Speech: Dysarthria  Psychiatric:  Cooperative, appropriate mood & affect.         Hospital Course:     Intractable nausea vomiting  Likely secondary to acute gastritis, will start patient on Protonix 40 mg IV daily, use Zofran/Reglan as needed for nausea.  IV fluids initiated, attempt dietary challenge with clear liquids in AM, advance as tolerated.  If symptoms persist consider MRI to rule out cerebellar stroke     Acute UTI  Patient started on Rocephin 1 g IVPB every 24 hours, cultures pending.  Will continue to monitor.      CVA  Continue aspirin statin and Plavix     Uncontrolled hypertension  Questionable compliance particularly in view of severe nausea, will use IV hydralazine/labetalol as needed for uncontrolled pressures, resume home meds.     Hypokalemia  Secondary to repeated emesis, initiate electrolyte replacement protocol, will supplement potassium with IV fluids as well.     Prophylaxis  Subcutaneous heparin     CODE STATUS  Full     Global A/P  -Reviewed previous consultant notes  -Reviewed CBC, BMP, Mag, and Phos  -Reviewed tests ordered  -Repeat labs in am  -MDM: High, severe exacerbation of chronic illness posing a threat to life. IV medications requiring close inpatient monitoring.    History of Present Illness:    Per admit  Mitch Wick is a(n) 63 year old female, with a past medical history significant for hypertension, CVA with right-sided deficit and dysarthria presents with complaint of intractable nausea vomiting and headache ongoing for the past week.  Describes the onset as gradual with progressive worsening aggravated by food with no relieving factors.  Claims she has barely able to even drink fluids without throwing up.  Also complains of occasional periods of dizziness.  Denies any diarrhea or constipation.  History limited due to patient's severe dysarthria    Disposition: Final discharge disposition not confirmed    Discharge Condition: Good    Discharge Medications:   Current Discharge Medication List        START taking these medications    Details   amLODIPine 10 MG Oral Tab Take 1 tablet (10 mg total) by mouth daily.  Qty: 30 tablet, Refills: 0      aspirin 81 MG Oral Tab EC Take 1 tablet (81 mg total) by mouth daily.  Qty: 30 tablet, Refills: 0      atorvastatin 40 MG Oral Tab Take 1 tablet (40 mg total) by mouth nightly.  Qty: 30 tablet, Refills: 0      clopidogrel 75 MG Oral Tab Take 1 tablet (75 mg total) by mouth daily.  Qty: 30 tablet, Refills: 0      losartan 100 MG Oral Tab Take 1 tablet (100  mg total) by mouth daily.  Qty: 30 tablet, Refills: 0      cefuroxime 500 MG Oral Tab Take 1 tablet (500 mg total) by mouth 2 (two) times daily for 5 days.  Qty: 10 tablet, Refills: 0           Total dc time > 30 min    Bill Oshea MD  1/5/2024  2:39 PM     Hospital Discharge Diagnoses:  intractable nausea vomiting     Lace+ Score: 28  59-90 High Risk  29-58 Medium Risk  0-28   Low Risk.    TCM Follow-Up Recommendation:  LACE 29-58: Moderate Risk of readmission after discharge from the hospital.

## 2024-01-05 NOTE — DISCHARGE INSTRUCTIONS
Follow up with your primary dr    Return to emergency room if unable to tolerate your diet, or if you have continued nausea and vomiting

## 2024-01-05 NOTE — PLAN OF CARE
Mitch is from home with her spouse. Alert and oriented x 4 . Hungarian/english speaking-declines translation cart. Admitted with intractable vomiting. Ct abd/pelvis completed-see report. Ac.hs glucose monitoring-sliding scale as indicated, Ivf, iv rocephin-ua collected awaiting culture, afebrile, voiding, lbm 1/2, diet advanced as tolerated-1800 ada tolerating at this time-denies any gi symptoms ppi(protnoix), and prn zofran, denies need for pain med, oob with sba-safety intact. Potassium, mg, phos all replaced per protocol, home with oral abx-refer to dc instructions/summary  Problem: Patient Centered Care  Goal: Patient preferences are identified and integrated in the patient's plan of care  Description: Interventions:  - What would you like us to know as we care for you? I live with my   - Provide timely, complete, and accurate information to patient/family  - Incorporate patient and family knowledge, values, beliefs, and cultural backgrounds into the planning and delivery of care  - Encourage patient/family to participate in care and decision-making at the level they choose  - Honor patient and family perspectives and choices  Outcome: Adequate for Discharge     Problem: Patient/Family Goals  Goal: Patient/Family Long Term Goal  Description: Patient's Long Term Goal: return to baseline adls    Interventions:  - pain control  mobilize  - See additional Care Plan goals for specific interventions  Outcome: Adequate for Discharge  Goal: Patient/Family Short Term Goal  Description: Patient's Short Term Goal: home today    Interventions:   - vs/labs wnl  [place] diet  - See additional Care Plan goals for specific interventions  Outcome: Adequate for Discharge     Problem: PAIN - ADULT  Goal: Verbalizes/displays adequate comfort level or patient's stated pain goal  Description: INTERVENTIONS:  - Encourage pt to monitor pain and request assistance  - Assess pain using appropriate pain scale  - Administer analgesics  based on type and severity of pain and evaluate response  - Implement non-pharmacological measures as appropriate and evaluate response  - Consider cultural and social influences on pain and pain management  - Manage/alleviate anxiety  - Utilize distraction and/or relaxation techniques  - Monitor for opioid side effects  - Notify MD/LIP if interventions unsuccessful or patient reports new pain  - Anticipate increased pain with activity and pre-medicate as appropriate  Outcome: Adequate for Discharge     Problem: SAFETY ADULT - FALL  Goal: Free from fall injury  Description: INTERVENTIONS:  - Assess pt frequently for physical needs  - Identify cognitive and physical deficits and behaviors that affect risk of falls.  - [place] fall precautions as indicated by assessment.  - Educate pt/family on patient safety including physical limitations  - Instruct pt to call for assistance with activity based on assessment  - Modify environment to reduce risk of injury  - Provide assistive devices as appropriate  - Consider OT/PT consult to assist with strengthening/mobility  - Encourage toileting schedule  Outcome: Adequate for Discharge     Problem: DISCHARGE PLANNING  Goal: Discharge to home or other facility with appropriate resources  Description: INTERVENTIONS:  - Identify barriers to discharge w/pt and caregiver  - Include patient/family/discharge partner in discharge planning  - Arrange for needed discharge resources and transportation as appropriate  - Identify discharge learning needs (meds, wound care, etc)  - Arrange for interpreters to assist at discharge as needed  - Consider post-discharge preferences of patient/family/discharge partner  - Complete POLST form as appropriate  - Assess patient's ability to be responsible for managing their own health  - Refer to Case Management Department for coordinating discharge planning if the patient needs post-hospital services based on physician/LIP order or complex needs  related to functional status, cognitive ability or social support system  Outcome: Adequate for Discharge     Problem: Altered Communication/Language Barrier  Goal: Patient/Family is able to understand and participate in their care  Description: Interventions:  - Assess communication ability and preferred communication style  - Implement communication aides and strategies  - Use visual cues when possible  - Listen attentively, be patient, do not interrupt  - Minimize distractions  - Allow time for understanding and response  - Establish method for patient to ask for assistance (call light)  - Provide an  as needed  - Communicate barriers and strategies to overcome with those who interact with patient  Outcome: Adequate for Discharge

## 2024-01-05 NOTE — PLAN OF CARE
Mitch is from home with her spouse. Alert and oriented x 4 . Romansh/english speaking-declines translation cart. Monitoring blood sugar per order. Remote tele in place. Continuous IV fluid infusing. Up with standby assist. Voiding freely. Denies needing any pain or antiemetic meds. Call light within reach. Safety precaution in place. D.C. plan is return home when medically clear.    Problem: Patient Centered Care  Goal: Patient preferences are identified and integrated in the patient's plan of care  Description: Interventions:  - What would you like us to know as we care for you?   - Provide timely, complete, and accurate information to patient/family  - Incorporate patient and family knowledge, values, beliefs, and cultural backgrounds into the planning and delivery of care  - Encourage patient/family to participate in care and decision-making at the level they choose  - Honor patient and family perspectives and choices  Outcome: Progressing     Problem: Patient/Family Goals  Goal: Patient/Family Long Term Goal  Description: Patient's Long Term Goal: Return home    Interventions:  - Follows plan of care  - anti-emetics med prn  - Pain management  - Monitor labs  - See additional Care Plan goals for specific interventions  Outcome: Progressing  Goal: Patient/Family Short Term Goal  Description: Patient's Short Term Goal: No more nausea    Interventions:   - anti-emetics med prn  - See additional Care Plan goals for specific interventions  Outcome: Progressing     Problem: PAIN - ADULT  Goal: Verbalizes/displays adequate comfort level or patient's stated pain goal  Description: INTERVENTIONS:  - Encourage pt to monitor pain and request assistance  - Assess pain using appropriate pain scale  - Administer analgesics based on type and severity of pain and evaluate response  - Implement non-pharmacological measures as appropriate and evaluate response  - Consider cultural and social influences on pain and pain  management  - Manage/alleviate anxiety  - Utilize distraction and/or relaxation techniques  - Monitor for opioid side effects  - Notify MD/LIP if interventions unsuccessful or patient reports new pain  - Anticipate increased pain with activity and pre-medicate as appropriate  Outcome: Progressing     Problem: SAFETY ADULT - FALL  Goal: Free from fall injury  Description: INTERVENTIONS:  - Assess pt frequently for physical needs  - Identify cognitive and physical deficits and behaviors that affect risk of falls.  - [place] fall precautions as indicated by assessment.  - Educate pt/family on patient safety including physical limitations  - Instruct pt to call for assistance with activity based on assessment  - Modify environment to reduce risk of injury  - Provide assistive devices as appropriate  - Consider OT/PT consult to assist with strengthening/mobility  - Encourage toileting schedule  Outcome: Progressing     Problem: DISCHARGE PLANNING  Goal: Discharge to home or other facility with appropriate resources  Description: INTERVENTIONS:  - Identify barriers to discharge w/pt and caregiver  - Include patient/family/discharge partner in discharge planning  - Arrange for needed discharge resources and transportation as appropriate  - Identify discharge learning needs (meds, wound care, etc)  - Arrange for interpreters to assist at discharge as needed  - Consider post-discharge preferences of patient/family/discharge partner  - Complete POLST form as appropriate  - Assess patient's ability to be responsible for managing their own health  - Refer to Case Management Department for coordinating discharge planning if the patient needs post-hospital services based on physician/LIP order or complex needs related to functional status, cognitive ability or social support system  Outcome: Progressing     Problem: Altered Communication/Language Barrier  Goal: Patient/Family is able to understand and participate in their  care  Description: Interventions:  - Assess communication ability and preferred communication style  - Implement communication aides and strategies  - Use visual cues when possible  - Listen attentively, be patient, do not interrupt  - Minimize distractions  - Allow time for understanding and response  - Establish method for patient to ask for assistance (call light)  - Provide an  as needed  - Communicate barriers and strategies to overcome with those who interact with patient  Outcome: Progressing

## 2024-01-08 ENCOUNTER — PATIENT OUTREACH (OUTPATIENT)
Dept: CASE MANAGEMENT | Age: 64
End: 2024-01-08

## 2024-01-10 ENCOUNTER — ORDER TRANSCRIPTION (OUTPATIENT)
Dept: PHYSICAL THERAPY | Facility: HOSPITAL | Age: 64
End: 2024-01-10

## 2024-01-10 ENCOUNTER — APPOINTMENT (OUTPATIENT)
Dept: OCCUPATIONAL MEDICINE | Facility: HOSPITAL | Age: 64
End: 2024-01-10
Attending: INTERNAL MEDICINE
Payer: COMMERCIAL

## 2024-01-11 ENCOUNTER — TELEPHONE (OUTPATIENT)
Dept: PHYSICAL THERAPY | Facility: HOSPITAL | Age: 64
End: 2024-01-11

## 2024-01-15 ENCOUNTER — TELEPHONE (OUTPATIENT)
Dept: PHYSICAL THERAPY | Facility: HOSPITAL | Age: 64
End: 2024-01-15

## 2024-01-16 ENCOUNTER — OFFICE VISIT (OUTPATIENT)
Dept: OCCUPATIONAL MEDICINE | Facility: HOSPITAL | Age: 64
End: 2024-01-16
Attending: INTERNAL MEDICINE
Payer: COMMERCIAL

## 2024-01-16 ENCOUNTER — TELEPHONE (OUTPATIENT)
Dept: PHYSICAL THERAPY | Facility: HOSPITAL | Age: 64
End: 2024-01-16

## 2024-01-16 DIAGNOSIS — R29.898 RIGHT ARM WEAKNESS: Primary | ICD-10-CM

## 2024-01-16 DIAGNOSIS — I69.30 HISTORY OF ISCHEMIC CEREBROVASCULAR ACCIDENT WITH RESIDUAL DEFICIT: ICD-10-CM

## 2024-01-16 PROCEDURE — 97166 OT EVAL MOD COMPLEX 45 MIN: CPT

## 2024-01-16 NOTE — PROGRESS NOTES
OCCUPATIONAL THERAPY NEUROLOGICAL EVALUATION:.     Diagnosis:   Right arm weakness (R29.898)  History of ischemic cerebrovascular accident with residual deficit (I69.30)      Referring Provider: Joann  Date of Evaluation:    1/16/2024    Precautions:   Hx of CVA Next MD visit:   none scheduled  Date of Surgery: n/a     PATIENT SUMMARY   [place]  ID: [id number]    Mitch Wick is a 64 year old female who presents to therapy today with a hx of CVA in June 2023. History difficult to obtain d/t aphasia and language barrier. Per pt, pt's  took pt to the ER after he noticed stroke like symptoms at home and pt was hospitalized for a few days. Pt returned home to recover and symptoms persisted into the following months therefore she met with her PCP who recommended rehab including occupational and speech therapy. Pt is set to be evaluated for speech in the next couple weeks. Pt reports post CVA, pt has had difficulty with RUE, vision, and speech.  Living Situation: Live with  and adult son  Dominant Hand: R hand  Pt describes pain level: current 7/10 in R hand and elbow    Imaging:   Per hospital discharge follow-up note on 06/08/23 signed by [place] David MD:   -head CT revealed small acute infarction within the left centrum semiovale. Chronic bilateral thalamic lacunar infarctions. Small chronic white matter infarction in the right cerebellum. HEAD CTA with significant intracranial stenoses involving the anterior and posterior circulation. Neg carotid Doppler, 2 D eccho. Brain MRI with acute nonhemorrhagic lacunar infarctions within the left centrum semiovale and right caudate head, superimposed on a background of severe chronic stable small vessel disease. No intracranial hemorrhage.     Current functional limitations include independent.  Mitch describes prior level of function assistance from  for dressing and cooking. Pt performs grooming, toileting, and bathing with one hand. Pt goals  include gain pain free functioning in her RUE.  Past medical history was reviewed with Mitch. Significant findings include  has a past medical history of Essential hypertension.     ASSESSMENT  Mitch presents to occupational therapy evaluation with primary c/o RUE weakness, lack of AROM, and pain 2/2 CVA. Pt's stroke occurred in June of 2023. Pt has not been seen for any skilled rehabilitation in the past. Pt was a poor historian with difficulties in speech d/t aphasia. Pt required extended time to obtain occupational profile. Pt has been using LUE for all adls as pt is unable to make a fist with RUE or demo pincer grasp. Pt's  provides assistance with dressing and iadls, as well as, driving. The results of the objective tests and measures show deficits in R shoulder, elbow, and hand AROM, RUE strength, RUE coordination and motor planning.  Functional deficits include but are not limited to compensating for all adls with LUE with  aiding in dressing and iadls. Signs and symptoms are consistent with diagnosis of Right arm weakness and  History of ischemic cerebrovascular accident with residual deficit . Pt and OT discussed evaluation findings, pathology, and POC, pt voiced understanding. Skilled Occupational Therapy is medically necessary to address the above impairments and reach functional goals.    OBJECTIVE   Observation: delayed motor planning, inability to grasp and transport items within R hand    Cognition:  delayed speech    Vision:  unable to close Left eye however able to track target     Sensory: Tingling: Yes, in R hand    Coordination:   Finger to Nose: mod deficits, ataxic motor movements noted, undershooting observed   Rapid Alternating Movements: max deficits, delayed coordination   9 Hole Peg Test: unable    Orthotics: none    Edema: none    ROM: WNL CAROLYN UE except below  Shoulder  Elbow   Flexion: R 78; L 145  Abduction: R 85; L 152   Flexion: R 100; L 138       AROM:(Degrees)  RIGHT  HAND:    Thumb IF MF RF SF   MP 0/40 0/15 0/14 0/0 0/0   PIP 0/14 0/55 0/50 -45/65 0/0   DIP  0/0 0/24 0/0 0/0   ROMERO 54 70 88 20 0       STRENGTH/MMT: 5/5 CAROLYN UE except below:  Shoulder Elbow Wrist   Flexion: R 3-/5; L 4+/5  Abduction: R 3-/5; L 5/5   Flexion: R 3-/5; L 5/5  Extension: R 3-/5; L 5/5   Flexion: R 3+/5, L 5/5  Extension: R 3+/5, L 5/5       Strength (lbs) Right Average Left Average   : Unable 42   2 pt Pinch: unable 5   3 pt Pinch: unable 6   Lateral Pinch: unable 5       Today's Treatment and Response:   Pt education was provided on exam findings, treatment diagnosis, treatment plan, expectations, and prognosis.Patient was instructed in and issued a HEP for: unable to establish d/t time constraints and extended time needed for occupational profile    Charges: OT Eval Moderate Complexity,       Total Timed Treatment: 0 min     Total Treatment Time: 45 min     Based on clinical rationale and outcome measures, this evaluation involved Moderate Complexity decision making due to 3+ personal factors/comorbidities, 3 body structures involved/activity limitations, and evolving symptoms including changing pain levels and decline in functional independence .  PLAN OF CARE:    Goals: (to be met in 12 visits)  Pt complaints of pain in right shoulder will decrease at worst to 1/10.  Pt will be modified independent and compliant with comprehensive HEP to maintain progress achieved in OT.  Pt will demo R handed pincer grasp to transport 10 items within 3/4 trials.  Pt will increase R shoulder flexion and abduction to 100 degrees for ease of dressing.  Pt will decrease her QuickDASH score by 10 points to demo improved functional independence.      Frequency / Duration: Patient will be seen for 2x/week or a total of 12 visits over a 90 day period.  Treatment will include: Neuromuscular Re-education, Manual Therapy, Therapeutic Exercise, Therapeutic Activity, Electrical Stim, Splinting, Pt education, Home exercise  program, joint protection techniques, and activity modifications     Education or treatment limitation: Communication,  used  Rehab Potential:good    QuickDASH Outcome Score  Score: 81.82 % (1/15/2024  4:58 PM)      Patient/Family/Caregiver was advised of these findings, precautions, and treatment options and has agreed to actively participate in planning and for this course of care.    Thank you for your referral. Please co-sign or sign and return this letter via fax as soon as possible to [phone]. If you have any questions, please contact me at Dept: [phone]    Sincerely,  Electronically signed by therapist: Sonali Flores MS, OTR/L   Physician's certification required: Yes  I certify the need for these services furnished under this plan of treatment and while under my care.    X___________________________________________________ Date____________________    Certification From: 1/16/2024  To:4/15/2024

## 2024-01-18 ENCOUNTER — OFFICE VISIT (OUTPATIENT)
Dept: OCCUPATIONAL MEDICINE | Facility: HOSPITAL | Age: 64
End: 2024-01-18
Attending: INTERNAL MEDICINE
Payer: COMMERCIAL

## 2024-01-18 PROCEDURE — 97110 THERAPEUTIC EXERCISES: CPT

## 2024-01-18 PROCEDURE — 97112 NEUROMUSCULAR REEDUCATION: CPT

## 2024-01-18 NOTE — PROGRESS NOTES
Diagnosis:   Right arm weakness (R29.898)  History of ischemic cerebrovascular accident with residual deficit (I69.30)      Referring Provider: Joann  Date of Evaluation:    1/16/2024    Precautions:  Hx of CVA Next MD visit:   none scheduled  Date of Surgery: n/a   Insurance Primary/Secondary: BCBS OUT OF STATE / N/A     # Auth Visits: 12 visits 1/1- 4/1           Subjective: Pt states pain is present in her RUE.     Pain: 7/10 in R shoulder and elbow.      Objective: See exercises flowsheet below for exercises performed.       Assessment: Session began with education on use of mirror therapy to elicit movement in R hand. Pt instructed to watch reflection of left hand in mirror while attempting to move right hand. Pt able to demo mild movement in R hand for composite flexion and extension. Session progressed to working towards composite flexion and pincer grasp. Pt had greater success with cone activity than velcro checkers.       Goals: (to be met in 12 visits)  Pt complaints of pain in right shoulder will decrease at worst to 1/10.  Pt will be modified independent and compliant with comprehensive HEP to maintain progress achieved in OT.  Pt will demo R handed pincer grasp to transport 10 items within 3/4 trials.  Pt will increase R shoulder flexion and abduction to 100 degrees for ease of dressing.  Pt will decrease her QuickDASH score by 10 points to demo improved functional independence.    Plan: Continue with large item composite grasp  Date 1/18/24                  Visit # 2/12                                    Evaluation                 Manual                                                                             Ther ex                   Towel slides x20           Hand-over hand velcro  removal in composite flexion (15 mins)                   Cone stacking and unstacking (10 mins)                                                                                                 HEP instruction  towel  slides                                     Therapeutic Activity                                       Neuromuscular Re-education                      Mirror AROM  Digit flex/extend, isolated finger extension, opposition, wrist extension  x20                                     Modalities                                                              HEP:  Assignment date:   Shoulder and elboe flexion towel slides 1/18/24                Charges: TE 2 (25), NMRE 1 (15)       Total Timed Treatment: 40 min  Total Treatment Time: 40 min

## 2024-01-22 ENCOUNTER — OFFICE VISIT (OUTPATIENT)
Dept: OCCUPATIONAL MEDICINE | Facility: HOSPITAL | Age: 64
End: 2024-01-22
Attending: INTERNAL MEDICINE
Payer: COMMERCIAL

## 2024-01-22 PROCEDURE — 97112 NEUROMUSCULAR REEDUCATION: CPT

## 2024-01-22 PROCEDURE — 97110 THERAPEUTIC EXERCISES: CPT

## 2024-01-22 NOTE — PROGRESS NOTES
Diagnosis:   Right arm weakness (R29.898)  History of ischemic cerebrovascular accident with residual deficit (I69.30)      Referring Provider: Joann  Date of Evaluation:    1/16/2024    Precautions:  Hx of CVA Next MD visit:   none scheduled  Date of Surgery: n/a   Insurance Primary/Secondary: BCBS OUT OF STATE / N/A     # Auth Visits: 12 visits 1/1- 4/1           Subjective: Pt continues to state pain is present in her RUE.     Pain: Present in R shoulder and elbow. Pt did not rate      Objective: See exercises flowsheet below for exercises performed.       Assessment: OT discussed and encourage pt to discuss RUE pain with doctor. Pt demo'd good carry over of previously assigned HEP as evidenced by min v/c for accuracy. Mirror therapy continued. Session continued to focus on composite grasp. When R hand passively placed in a fist, pt able to hold positioning however actively unable to create full fist.        Goals: (to be met in 12 visits)  Pt complaints of pain in right shoulder will decrease at worst to 1/10.  Pt will be modified independent and compliant with comprehensive HEP to maintain progress achieved in OT.  Pt will demo R handed pincer grasp to transport 10 items within 3/4 trials.  Pt will increase R shoulder flexion and abduction to 100 degrees for ease of dressing.  Pt will decrease her QuickDASH score by 10 points to demo improved functional independence.    Plan: Continue with large item composite grasp  Date 1/18/24 1/22/24                Visit # 2/12  3/12                                  Evaluation                 Manual                                                                             Ther ex                   Towel slides x20 x20          Hand-over hand velcro  removal in composite flexion (15 mins) Functional composite grasp to create a fist to grasp and transport cone and marbles from one bucket to another (10 mins)                 Cone stacking and unstacking (10 mins) Cone  stacking and unstacking (10 mins)                   PROM digital flexion with 10 second holds x20                   Tennis ball digital extension to roll it                                                       HEP instruction towel slides                                     Therapeutic Activity                                       Neuromuscular Re-education                      Mirror AROM Digit flex/extend, isolated finger extension, opposition, wrist extension  x20 Digit flex/extend,isolated finger extension, opposition, wrist extension  x20                                   Modalities                                                              HEP:  Assignment date:   Shoulder and elboe flexion towel slides 1/18/24                Charges: NMRE 1 (15), TE 2 (26)    Total Timed Treatment: 41 min  Total Treatment Time: 41 min

## 2024-01-24 ENCOUNTER — OFFICE VISIT (OUTPATIENT)
Dept: OCCUPATIONAL MEDICINE | Facility: HOSPITAL | Age: 64
End: 2024-01-24
Attending: INTERNAL MEDICINE
Payer: COMMERCIAL

## 2024-01-24 PROCEDURE — 97110 THERAPEUTIC EXERCISES: CPT

## 2024-01-24 PROCEDURE — 97112 NEUROMUSCULAR REEDUCATION: CPT

## 2024-01-24 NOTE — PROGRESS NOTES
Diagnosis:   Right arm weakness (R29.898)  History of ischemic cerebrovascular accident with residual deficit (I69.30)      Referring Provider: Joann  Date of Evaluation:    1/16/2024    Precautions:  Hx of CVA Next MD visit:   none scheduled  Date of Surgery: n/a  Date of CVA: June 2023    Insurance Primary/Secondary: BCBS OUT OF STATE / N/A     # Auth Visits: 12 visits 1/1- 4/1           Subjective: Pt continues to state pain is present in her RUE.     Pain: Present in R shoulder and elbow. Pt did not rate      Objective: See exercises flowsheet below for exercises performed.       Assessment: To increase neuro synaptics signal, OT applied kinesiotape to R hand around digits to encourage composite flexion, some improvement with this technique. Mirror therapy continued with AAROM today for R hand. Pt able to isolate digital extension for D2-3, however challenging for D4-5. When passively place in composite flexion of the R hand, pt able to hold position actively for about 5 seconds.    Goals: (to be met in 12 visits)  Pt complaints of pain in right shoulder will decrease at worst to 1/10.  Pt will be modified independent and compliant with comprehensive HEP to maintain progress achieved in OT.  Pt will demo R handed pincer grasp to transport 10 items within 3/4 trials.  Pt will increase R shoulder flexion and abduction to 100 degrees for ease of dressing.  Pt will decrease her QuickDASH score by 10 points to demo improved functional independence.    Plan: Continue with large item composite grasp, continue PROM and add to HEP  Date 1/18/24 1/22/24 1/24/24              Visit # 2/12  3/12  4/12                                Evaluation                 Manual                                                                             Ther ex                   Towel slides x20 x20 Digital PROM composite flexion and extension x30         Hand-over hand velcro  removal in composite flexion (15 mins) Functional  composite grasp to create a fist to grasp and transport cone and marbles from one bucket to another (10 mins) Functional composite grasp to create a fist to grasp and place large pegs into board (20 pegs)               Cone stacking and unstacking (10 mins) Cone stacking and unstacking (10 mins) Prolonged cone grasp activity with marble transportation                 PROM digital flexion with 10 second holds x20                   Tennis ball digital extension to roll it                                                       HEP instruction towel slides                                     Therapeutic Activity                                       Neuromuscular Re-education                      Mirror AROM Digit flex/extend, isolated finger extension, opposition, wrist extension  x20 Digit flex/extend,isolated finger extension, opposition, wrist extension  x20  Digit flex/extend,isolated finger extension, opposition, wrist extension  x20                                 Modalities                                                              HEP:  Assignment date:   Shoulder and elbow flexion towel slides 1/18/24                Charges: NMRE 1 (15), TE 2 (28)    Total Timed Treatment: 43 min  Total Treatment Time: 43 min

## 2024-01-26 ENCOUNTER — APPOINTMENT (OUTPATIENT)
Dept: SPEECH THERAPY | Facility: HOSPITAL | Age: 64
End: 2024-01-26
Attending: INTERNAL MEDICINE
Payer: COMMERCIAL

## 2024-01-30 ENCOUNTER — APPOINTMENT (OUTPATIENT)
Dept: OCCUPATIONAL MEDICINE | Facility: HOSPITAL | Age: 64
End: 2024-01-30
Attending: INTERNAL MEDICINE
Payer: COMMERCIAL

## 2024-01-31 ENCOUNTER — OFFICE VISIT (OUTPATIENT)
Dept: OCCUPATIONAL MEDICINE | Facility: HOSPITAL | Age: 64
End: 2024-01-31
Attending: INTERNAL MEDICINE
Payer: COMMERCIAL

## 2024-01-31 PROCEDURE — 97110 THERAPEUTIC EXERCISES: CPT

## 2024-01-31 PROCEDURE — 97112 NEUROMUSCULAR REEDUCATION: CPT

## 2024-01-31 NOTE — PROGRESS NOTES
Diagnosis:   Right arm weakness (R29.898)  History of ischemic cerebrovascular accident with residual deficit (I69.30)      Referring Provider: Joann  Date of Evaluation:    1/16/2024    Precautions:  Hx of CVA Next MD visit:   none scheduled  Date of Surgery: n/a  Date of CVA: June 2023    Insurance Primary/Secondary: BCBS OUT OF STATE / N/A     # Auth Visits: 12 visits 1/1- 4/1           Subjective: Pt continues to report pain in the shoulder.    Pain: Present in R shoulder and elbow. Pt did not rate      Objective: See exercises flowsheet below for exercises performed.       Assessment: OT advised pt to discuss pain in her R shoulder with doctor as this continues to be an area of concern. Through use of milana taping pt able to demo composite grasp around velcro  blocks. OT continued training and demo'ing  PROM/AAROM of the hand exercises to patient, pt returned demo with min v/c until able to display independence. AAROM digital flexion and extension exercises added to HEP, handout provided.        Goals: (to be met in 12 visits)  Pt complaints of pain in right shoulder will decrease at worst to 1/10.  Pt will be modified independent and compliant with comprehensive HEP to maintain progress achieved in OT.  Pt will demo R handed pincer grasp to transport 10 items within 3/4 trials.  Pt will increase R shoulder flexion and abduction to 100 degrees for ease of dressing.  Pt will decrease her QuickDASH score by 10 points to demo improved functional independence.    Plan: Progress note next session  Date 1/18/24 1/22/24 1/24/24 1/31/24             Visit # 2/12  3/12  4/12  5/12                              Evaluation                 Manual                                                                             Ther ex                       Towel slides x20       Towel slides x20 x20 Digital PROM composite flexion and extension x30 Digital PROM composite flexion and extension x30        Hand-over hand  velcro  removal in composite flexion (15 mins) Functional composite grasp to create a fist to grasp and transport cone and marbles from one bucket to another (10 mins) Functional composite grasp to create a fist to grasp and place large pegs into board (20 pegs)  AROM velcro  removal in composite flexion (15 mins)  -coband on 4-5th digit for milana strapping             Cone stacking and unstacking (10 mins) Cone stacking and unstacking (10 mins) Prolonged cone grasp activity with marble transportation                 PROM digital flexion with 10 second holds x20   Non-weight dowel AROM supination/pronation x10, 2 sets               Tennis ball digital extension to roll it   Lumbrical strengthening through use of clamp to flex Mps with PIP/DIP straight to grasp and transport cotton balls from table to container                    Supine Cane shoulder flexion and shoulder abduction x10, 2 sets                               HEP instruction towel slides                                     Therapeutic Activity                                       Neuromuscular Re-education                      Mirror AROM Digit flex/extend, isolated finger extension, opposition, wrist extension  x20 Digit flex/extend,isolated finger extension, opposition, wrist extension  x20  Digit flex/extend,isolated finger extension, opposition, wrist extension  x20 Digit flex/extend,isolated finger extension, opposition, wrist extension  x30                               Modalities                                                              HEP:  Assignment date:   Shoulder and elbow flexion towel slides 1/18/24                  Charges: NMRE 1 (15), TE 2 (25)    Total Timed Treatment: 40 min  Total Treatment Time: 40 min

## 2024-02-02 ENCOUNTER — OFFICE VISIT (OUTPATIENT)
Dept: SPEECH THERAPY | Facility: HOSPITAL | Age: 64
End: 2024-02-02
Attending: INTERNAL MEDICINE
Payer: COMMERCIAL

## 2024-02-02 DIAGNOSIS — I69.30 HISTORY OF ISCHEMIC CEREBROVASCULAR ACCIDENT WITH RESIDUAL DEFICIT: Primary | ICD-10-CM

## 2024-02-02 DIAGNOSIS — I69.322 DYSARTHRIA AS LATE EFFECT OF CEREBELLAR CEREBROVASCULAR ACCIDENT (CVA): ICD-10-CM

## 2024-02-02 PROCEDURE — 92523 SPEECH SOUND LANG COMPREHEN: CPT

## 2024-02-02 NOTE — PROGRESS NOTES
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  She was hospitalized in [facility] and was discharged home without PT, OT or speech therapy.  She eventually was referred for outpatient therapy.  She is currently being seen by OT.      She is not sure whether she is having trouble thinking of the words or saying the words.  Her speech is the same in Macedonian and English. She lives with her  and adult son. She was working as a  prior to her stroke.  Family bill paying and finances are handled by her .      The patient went through high school in [facility] and came to the [place] when a teenager.    Incident/Injury: CVA 6/2023  Pain: 6-7/10, arm shoulder, elbow  Hospital/Rehab course: Pt was discharged home from [facility] and received no therapy until now.  She is currently enrolled in speech and occupational therapies  Current functional limitations include:  Pt is unable to communicate and use her hand at work, so is unable to work  Mitch describes prior level of function independent. Pt goals include \"I want a good... talking good.\"  Past medical history was reviewed with Mitch. Significant findings include DM, HTN.    ASSESSMENT  Mitch presents with mild-moderate mixed expressive and receptive aphasia. The patient initially refused Language Line , but during receptive language testing I strongly encouraged and she agreed.  Language  Line Togolese  Sherry, ID# [id number] assisted.   Pt and SLP discussed evaluation findings, pathology, POC and HEP.  Pt voiced understanding. Skilled Speech Therapy is medically necessary to address the above impairments and reach functional goals.     OBJECTIVE:   Assessment tools used: Perley Diagnostic Aphasia Examination (BDAE)  APHASIA/LANGUAGE DISORDER:  Severity: Moderate  Type: mixed  Verbal expression: Moderately impaired.  Pt's expressive language is telegraphic. Responsive naming 80%.  Generative naming. 9/min  Auditory Comprehension: Mildly impaired.  Complex directions 80% in English.  Complex yes/no 100% in Togolese, 25% English.  Paragraphs 75% in Togolese (not presented in English).    Written Expression: WNL.   bio info 100%.  Writing simple sentence in Togolese 100%.  Reading Comprehension: Not assessed.  Pt reports she only reads the mail and does no other reading.        COGNITIVE-COMMUNICATIVE SKILLS  Not assessed.  Pt refused  multiple times even though she did not fully understand the stimuli presented.  Subtle cognitive deficits suspected.      ORAL MOTOR MECHANISM  Facial and Oral Structure/Apperance: right facial droop  Not assessed.    SPEECH PRODUCTION DEFICITS  Severity: WNL/WFL  Speech is slow, but articulation intact with intelligibility 90-95%.  Reduced intelligibility due to language/accent difference rather than speech impairment.  Slow rate is likely due to verbal expression deficits rather than dysarthria.      Today's Treatment:  Pt education was provided on exam findings, treatment diagnosis, treatment plan, expectations, and prognosis. HEP was not issued due to time constraints.    Charges: Eval x1, 66455          Total Treatment Time: 45 min     PLAN OF CARE:    Goals: (to be met in 16 visits)   Pt will improve generative naming to 12 items/minute.  Pt will produce sentences given picture stim or wh- question with 90% accuracy.  Pt will communicate  wants, needs and recent events with 90% accuracy in sentences.  Pt will participate in oral [facility] exam to assess ROM and strength.  Pt will participate in cognitive assessment as warranted.    Frequency / Duration: Patient will be seen for 1-2 x/week or a total of 16 visits over a 90 day period. Treatment will include: Speech Therapy    Education or treatment limitation: Cognition, Communication, and language  Rehab Potential:good      Patient was advised of these findings, precautions, and treatment options and has agreed to actively participate in planning and for this course of care.    Thank you for your referral. Please co-sign or sign and return this letter via fax as soon as possible to [phone]. If you have any questions, please contact me at Dept: [phone]    Sincerely,  Electronically signed by therapist: Taylor Powers, SLP  Physician's certification required: Yes  I certify the need for these services furnished under this plan of treatment and while under my care.    X___________________________________________________ Date____________________    Certification From: 2/2/2024  To:5/2/2024

## 2024-02-05 ENCOUNTER — OFFICE VISIT (OUTPATIENT)
Dept: OCCUPATIONAL MEDICINE | Facility: HOSPITAL | Age: 64
End: 2024-02-05
Attending: INTERNAL MEDICINE
Payer: COMMERCIAL

## 2024-02-05 PROCEDURE — 97110 THERAPEUTIC EXERCISES: CPT

## 2024-02-05 PROCEDURE — 97112 NEUROMUSCULAR REEDUCATION: CPT

## 2024-02-05 NOTE — PROGRESS NOTES
Diagnosis:   Right arm weakness (R29.898)  History of ischemic cerebrovascular accident with residual deficit (I69.30)      Referring Provider: Joann  Date of Evaluation:    1/16/2024    Precautions:  Hx of CVA Next MD visit:   none scheduled  Date of Surgery: n/a  Date of CVA: June 2023    Insurance Primary/Secondary: BCBS OUT OF STATE / N/A     # Auth Visits: 12 visits 1/1- 4/1             Progress Summary    Pt has attended 6 visits in Occupational Therapy.     Subjective: Pt states her R shoulder continues to have pain with movement. Pt verbalized a little bit of improvement in her R hand mobility      Assessment: Pt has been seen for 6 skilled OT sessions in which she has worked to regain functioning in her RUE through AROM and functional tasks. Pt has made gains in R shoulder flexion, however, decreased in abduction. Pt consistently has demo'd improved grasp through improvements in R hand AROM, see measurements taken below. Pt met 2 goals, partially met her R shoulder goal, and progressed in the others. Pt would benefit from continued pre-approved skilled OT to address deficits and return to maximum functional potential.     Objective Data:     ROM: WNL CAROLYN UE except below  Shoulder Eval Shoulder Today   Flexion: R 78; L 145  Abduction: R 85; L 152    Flexion: R 105  Abduction: R 59         AROM:(Degrees)  RIGHT HAND:     Thumb Eval Thumb Today IF Eval IF Today MF Eval MF Today RF Eval RF Today SF Eval SF Today   MP 0/40 0/50 0/15 0/55 0/14 0/54 0/0 0/10 0/0 0/30   PIP 0/14 0/45 0/55 0/57 0/50 0/67 -45/65 -30/75 0/0 0/25   DIP    0/0 0/25 0/24 0/27 0/0 0/0 0/0 0/0   ROMERO 54 95 70 137 88 148 20 45 0 55       Goals:   Pt complaints of pain in right shoulder will decrease at worst to 1/10.-PROGRESSING improved to 5/10  Pt will be modified independent and compliant with comprehensive HEP to maintain progress achieved in OT.-MET, continued/will be updated  Pt will demo R handed pincer grasp to transport 10 items within  3/4 trials.-MET  Pt will increase R shoulder flexion and abduction to 100 degrees for ease of dressing.-MET in Flexion, not abduction  Pt will decrease her QuickDASH score by 10 points to demo improved functional independence.-NT   Updated/Additional Goal   Pt will increase their (R) SF ROMERO by 10-15 degrees for ease of holding a cup.   Pt will increase their (R) RF RMOERO by 10-15 degrees for ease of cupping medications    Rehab Potential: good    Plan: Continue skilled Occupational Therapy 2x/week or a total of 6 visits over a 90 day period. Treatment may include: Manual Therapy, Soft tissue mobilization, AROM, PROM, Strengthening, Therapeutic Activity, Moist heat, cryotherapy, Ultrasound, Whirlpool (fluidotherapy), Paraffin, Electrical Stim, Orthosis,  Neuromuscular Re-education, Patient education, Home exercise program,           Patient/Family/Caregiver was advised of these findings, precautions, and treatment options and has agreed to actively participate in planning and for this course of care.    Thank you for your referral. If you have any questions, please contact me at Dept: [phone].    Sincerely,  Electronically signed by therapist: Sonali Flores MS, OTR/L     Physician's certification required: No  Please co-sign or sign and return this letter via fax as soon as possible to [phone].   I certify the need for these services furnished under this plan of treatment and while under my care.    X___________________________________________________ Date____________________    Certification From: 2/5/2024  To:5/5/2024              Objective Treatment: See exercises flowsheet below for exercises performed.       Date 1/18/24 1/22/24 1/24/24 1/31/24 2/5/24          Visit # 2/12  3/12  4/12  5/12  6/12                            Evaluation                 Manual                                                                             Ther ex                       Towel slides x20       Towel slides  x20 x20 Digital PROM composite flexion and extension x30 Digital PROM composite flexion and extension x30 Digital AAROM composite flexion and extension x10, 2 sets with 5-10 sec holds           Exer Stick grasp and flex/extend wrist x20, 2 sets       Hand-over hand velcro  removal in composite flexion (15 mins) Functional composite grasp to create a fist to grasp and transport cone and marbles from one bucket to another (10 mins) Functional composite grasp to create a fist to grasp and place large pegs into board (20 pegs) AROM velcro  removal in composite flexion (15 mins)  -coband on 4-5th digit for milana strapping Velcro  removal in composite flexion (10 mins)  -coband on 4-5th digit for milana strapping           Cone stacking and unstacking (10 mins) Cone stacking and unstacking (10 mins) Prolonged cone grasp activity with marble transportation   Cone stacking and unstacking (10 mins)             PROM digital flexion with 10 second holds x20   Non-weight dowel AROM supination/pronation x10, 2 sets               Tennis ball digital extension to roll it   Lumbrical strengthening through use of clamp to flex Mps with PIP/DIP straight to grasp and transport cotton balls from table to container                    Supine Cane shoulder flexion and shoulder abduction x10, 2 sets                               HEP instruction towel slides                                     Therapeutic Activity                             Education and resources provided for [employer] [place] for neuro re-edu         Neuromuscular Re-education                      Mirror AROM Digit flex/extend, isolated finger extension, opposition, wrist extension  x20 Digit flex/extend,isolated finger extension, opposition, wrist extension  x20  Digit flex/extend,isolated finger extension, opposition, wrist extension  x20 Digit flex/extend,isolated finger extension, opposition, wrist extension  x30 Digit flex/extend,isolated  finger extension, opposition, wrist extension  x30                             Modalities                                                              HEP:  Assignment date:   Shoulder and elbow flexion towel slides 1/18/24   Passive Composite finger flexion 1/31/24            Charges: NMRE 1 (10), TE 2 (30)    Total Timed Treatment: 40 min  Total Treatment Time: 45 min

## 2024-02-07 ENCOUNTER — OFFICE VISIT (OUTPATIENT)
Dept: OCCUPATIONAL MEDICINE | Facility: HOSPITAL | Age: 64
End: 2024-02-07
Attending: INTERNAL MEDICINE
Payer: COMMERCIAL

## 2024-02-07 PROCEDURE — 97110 THERAPEUTIC EXERCISES: CPT

## 2024-02-07 NOTE — PROGRESS NOTES
Diagnosis:   Right arm weakness (R29.898)  History of ischemic cerebrovascular accident with residual deficit (I69.30)      Referring Provider: Joann  Date of Evaluation:    1/16/2024    Precautions:  Hx of CVA Next MD visit:   none scheduled  Date of Surgery: n/a  Date of CVA: June 2023    Insurance Primary/Secondary: BCBS OUT OF STATE / N/A     # Auth Visits: 12 visits 1/1- 4/1           Subjective: Pt states pain with all movement in her R arm    Pain: 8/10 in R shoulder      Objective: See exercises flowsheet below for exercises performed.       Assessment: OT continued to advise pt to discuss pain in her R shoulder with doctor as this continues to be an area of concern. Through use of milana taping pt continues to demo ability to display composite grasp around velcro  blocks. Shoulder AROM performed at end of session to aid in pain management. OT added cane exercises to HEP today.       Goals:   Pt complaints of pain in right shoulder will decrease at worst to 1/10.-PROGRESSING improved to 5/10  Pt will be modified independent and compliant with comprehensive HEP to maintain progress achieved in OT.-MET, continued/will be updated  Pt will increase R shoulder flexion and abduction to 100 degrees for ease of dressing.-MET in Flexion, not abduction  Pt will decrease her QuickDASH score by 10 points to demo improved functional independence.   Updated/Additional Goal   Pt will increase their (R) SF ROMERO by 10-15 degrees for ease of holding a cup.   Pt will increase their (R) RF ROMERO by 10-15 degrees for ease of cupping medications    Plan: follow up on cane exercises added to HEP    Date 1/18/24 1/22/24 1/24/24 1/31/24 2/5/24 2/7/24        Visit # 2/12  3/12  4/12  5/12  6/12  7/12                          Evaluation                 Manual                                                                             Ther ex                       Towel slides x20  Shoulder shrugs x10, 2 sets     Towel  slides x20 x20 Digital PROM composite flexion and extension x30 Digital PROM composite flexion and extension x30 Digital AAROM composite flexion and extension x10, 2 sets with 5-10 sec holds Digital AAROM composite flexion and extension x10, 2 sets with 5-10 sec holds          Exer Stick grasp and flex/extend wrist x20, 2 sets       Hand-over hand velcro  removal in composite flexion (15 mins) Functional composite grasp to create a fist to grasp and transport cone and marbles from one bucket to another (10 mins) Functional composite grasp to create a fist to grasp and place large pegs into board (20 pegs) AROM velcro  removal in composite flexion (15 mins)  -coband on 4-5th digit for milana strapping Velcro  removal in composite flexion (10 mins)  -coband on 4-5th digit for milana strapping Velcro  removal in composite flexion (10 mins)  -coband on 4-5th digit for milana strapping         Cone stacking and unstacking (10 mins) Cone stacking and unstacking (10 mins) Prolonged cone grasp activity with marble transportation   Cone stacking and unstacking (10 mins) Functional Reaching onto board on wall, half of board completed (15 mins)           PROM digital flexion with 10 second holds x20   Non-weight dowel AROM supination/pronation x10, 2 sets               Tennis ball digital extension to roll it   Lumbrical strengthening through use of clamp to flex Mps with PIP/DIP straight to grasp and transport cotton balls from table to container                    Supine Cane shoulder flexion and shoulder abduction x10, 2 sets   Supine Cane shoulder flexion and shoulder abduction x10, 2 sets                           HEP instruction towel slides                                     Therapeutic Activity                             Education and resources provided for [employer] [place] for neuro re-edu         Neuromuscular Re-education                      Mirror AROM Digit flex/extend, isolated  finger extension, opposition, wrist extension  x20 Digit flex/extend,isolated finger extension, opposition, wrist extension  x20  Digit flex/extend,isolated finger extension, opposition, wrist extension  x20 Digit flex/extend,isolated finger extension, opposition, wrist extension  x30 Digit flex/extend,isolated finger extension, opposition, wrist extension  x30                            Modalities                                                              HEP:  Assignment date:   Shoulder and elbow flexion towel slides 1/18/24   Passive Composite finger flexion 1/31/24              Charges: TE 3 (44)  Total Timed Treatment: 44 min  Total Treatment Time: 44 min

## 2024-02-09 ENCOUNTER — OFFICE VISIT (OUTPATIENT)
Dept: SPEECH THERAPY | Facility: HOSPITAL | Age: 64
End: 2024-02-09
Attending: INTERNAL MEDICINE
Payer: COMMERCIAL

## 2024-02-09 PROCEDURE — 92507 TX SP LANG VOICE COMM INDIV: CPT

## 2024-02-09 NOTE — PROGRESS NOTES
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  PT refused  so all session conducted in English.      Objective:      Date: 2/9/2024  Tx#: 1/16 Date:   Tx#: 2/ Date:   Tx#: 3/ Date:   Tx#: 4/ Date:   Tx#: 5/ Date:   Tx#: 6/ Date:   Tx#: 7/ Date:  Tx#: 8/   Generative naming 10 items named but with max cues and 5 min per category.          Sentence production Producing full sentence given action picture 2/2 accounting for ESL.          [place] in multi sentences Pt's conversational language remains telegraphic.                                    Assessment: Reviewed results of evaluation.  Discussed goals and patient's primary goal is to talk better.  Generative naming was slow as patient required 5+ minutes to generate 10 common items per category (fruit, furniture).  Given picture, patient able to produce subject-verb-object 2/2.  In conversation, however, expressive language is still very slow, and telegraphic.  Oral motor exam revealed large concavity on the hard palate.  Pt reports it has been there for 10-20 years.  Lingual ROM WNL.  Strength minimally reduced at midline and severely reduced bilaterally.  Mild-mod reduced labial protrusion and retraction on the left. Labial strength minimally reduced on left.  WNL on right and at midline.  Flattened nasolabial fold on right.  Articulation intact.        Goals: (to be met in 16 visits)   Pt will improve generative naming to 12 items/minute.  Pt will produce sentences given picture stim or wh- question with 90% accuracy.  Pt will communicate wants, needs and recent events with 90% accuracy in sentences.  Pt will participate in oral [facility] exam to assess ROM and  strength. met  Pt will participate in cognitive assessment as warranted.    Plan: Continue therapy per specified goals    Skilled Services: speech language therapy    Charges: 61001         Total Treatment Time: 50 min    Taylor Powers MA/CCC-SLP  Speech Language Pathologist  [facility]  [phone]

## 2024-02-13 ENCOUNTER — OFFICE VISIT (OUTPATIENT)
Dept: OCCUPATIONAL MEDICINE | Facility: HOSPITAL | Age: 64
End: 2024-02-13
Attending: INTERNAL MEDICINE
Payer: COMMERCIAL

## 2024-02-13 PROCEDURE — 97110 THERAPEUTIC EXERCISES: CPT

## 2024-02-13 NOTE — PROGRESS NOTES
Diagnosis:   Right arm weakness (R29.898)  History of ischemic cerebrovascular accident with residual deficit (I69.30)      Referring Provider: Joann  Date of Evaluation:    1/16/2024    Precautions:  Hx of CVA Next MD visit:   none scheduled  Date of Surgery: n/a  Date of CVA: June 2023    Insurance Primary/Secondary: BCBS OUT OF STATE / N/A     # Auth Visits: 12 visits 1/1- 4/1           Subjective: Pt verbalized feeling only a little improvement and continued pain in R shoulder    Pain: 7/10 in R shoulder      Objective: See exercises flowsheet below for exercises performed.       Assessment: OT continued to advise pt to discuss pain in her R shoulder with doctor. OT discussed with pt the importance of functionally using pt's R hand. OT assigned pt to participate in 5 functional tasks at home with R hand. Pt required max cues to perform full composite grasp to pinch marbles out of bucket. Pt able to transport 3 marbles within 10 min trial.     Goals:   Pt complaints of pain in right shoulder will decrease at worst to 1/10.-PROGRESSING improved to 5/10  Pt will be modified independent and compliant with comprehensive HEP to maintain progress achieved in OT.-MET, continued/will be updated  Pt will increase R shoulder flexion and abduction to 100 degrees for ease of dressing.-MET in Flexion, not abduction  Pt will decrease her QuickDASH score by 10 points to demo improved functional independence.   Updated/Additional Goal   Pt will increase their (R) SF ROMERO by 10-15 degrees for ease of holding a cup.   Pt will increase their (R) RF ROMERO by 10-15 degrees for ease of cupping medications    Plan: follow up on 5 functional task assignment    Date 1/18/24 1/22/24 1/24/24 1/31/24 2/5/24 2/7/24 2/13/2024      Visit # 2/12  3/12  4/12  5/12  6/12  7/12  8/12                        Evaluation        Progress note performed        Manual                                                                             Ther  ex                       Towel slides x20  Shoulder shrugs x10, 2 sets     Towel slides x20 x20 Digital PROM composite flexion and extension x30 Digital PROM composite flexion and extension x30 Digital AAROM composite flexion and extension x10, 2 sets with 5-10 sec holds Digital AAROM composite flexion and extension x10, 2 sets with 5-10 sec holds Digital AAROM composite flexion and extension x10, 2 sets with 5-10 sec holds         Exer Stick grasp and flex/extend wrist x20, 2 sets       Hand-over hand velcro  removal in composite flexion (15 mins) Functional composite grasp to create a fist to grasp and transport cone and marbles from one bucket to another (10 mins) Functional composite grasp to create a fist to grasp and place large pegs into board (20 pegs) AROM velcro  removal in composite flexion (15 mins)  -coband on 4-5th digit for milana strapping Velcro  removal in composite flexion (10 mins)  -coband on 4-5th digit for milana strapping Velcro  removal in composite flexion (10 mins)  -coband on 4-5th digit for milana strapping Functional composite grasp to create a fist to cup and transport corn from one bucket to another (10 mins)       Cone stacking and unstacking (10 mins) Cone stacking and unstacking (10 mins) Prolonged cone grasp activity with marble transportation   Cone stacking and unstacking (10 mins) Functional Reaching onto board on wall, half of board completed (15 mins) [place] pincer grasp 10 mins         PROM digital flexion with 10 second holds x20   Non-weight dowel AROM supination/pronation x10, 2 sets               Tennis ball digital extension to roll it   Lumbrical strengthening through use of clamp to flex Mps with PIP/DIP straight to grasp and transport cotton balls from table to container                    Supine Cane shoulder flexion and shoulder abduction x10, 2 sets   Supine Cane shoulder flexion and shoulder abduction x10, 2 sets Wall slides  shoulder  flexion and shoulder abduction x10, 2 sets                         HEP instruction towel slides                                     Therapeutic Activity                             Education and resources provided for [employer] [employer] [place] for neuro re-edu         Neuromuscular Re-education                      Mirror AROM Digit flex/extend, isolated finger extension, opposition, wrist extension  x20 Digit flex/extend,isolated finger extension, opposition, wrist extension  x20  Digit flex/extend,isolated finger extension, opposition, wrist extension  x20 Digit flex/extend,isolated finger extension, opposition, wrist extension  x30 Digit flex/extend,isolated finger extension, opposition, wrist extension  x30                            Modalities                                                              HEP:  Assignment date:   Shoulder and elbow flexion towel slides 1/18/24   Passive Composite finger flexion 1/31/24            Charges: TE 3 (43)  Total Timed Treatment: 43 min  Total Treatment Time: 43 min

## 2024-02-15 ENCOUNTER — OFFICE VISIT (OUTPATIENT)
Dept: OCCUPATIONAL MEDICINE | Facility: HOSPITAL | Age: 64
End: 2024-02-15
Attending: INTERNAL MEDICINE
Payer: COMMERCIAL

## 2024-02-15 PROCEDURE — 97110 THERAPEUTIC EXERCISES: CPT

## 2024-02-15 NOTE — PROGRESS NOTES
Diagnosis:   Right arm weakness (R29.898)  History of ischemic cerebrovascular accident with residual deficit (I69.30)      Referring Provider: Joann  Date of Evaluation:    1/16/2024    Precautions:  Hx of CVA Next MD visit:   none scheduled  Date of Surgery: n/a  Date of CVA: June 2023    Insurance Primary/Secondary: BCBS OUT OF STATE / N/A     # Auth Visits: 12 visits 1/1- 4/1           Subjective: Pt states trying some functional tasks at home. \"My hand helped with doing my hair.\"    Pain: present did not rate      Objective: See exercises flowsheet below for exercises performed.       Assessment: Pt did not verbalize completing assignment of performing functional tasks. OT reiterated the importance of using RUE for functional tasks to aid in improving mobility. Heavy cues for pt to slow her donald in activities to focus on full composite grasp. When pt moves quickly she only utilizes her R thumb and RF. As session progressed pt able to demo mild improvement in R SF flexion for composite grasp    Goals:   Pt complaints of pain in right shoulder will decrease at worst to 1/10.-PROGRESSING improved to 5/10  Pt will be modified independent and compliant with comprehensive HEP to maintain progress achieved in OT.-MET, continued/will be updated  Pt will increase R shoulder flexion and abduction to 100 degrees for ease of dressing.-MET in Flexion, not abduction  Pt will decrease her QuickDASH score by 10 points to demo improved functional independence.   Updated/Additional Goal   Pt will increase their (R) SF ROMERO by 10-15 degrees for ease of holding a cup.   Pt will increase their (R) RF ROMERO by 10-15 degrees for ease of cupping medications      Plan: continue with flexbar and functional reaching tasks    Date 1/18/24   1/22/24   1/24/24   1/31/24    2/5/24   2/7/24  2/13/2024   2/15/2024    Visit # 2/12  3/12  4/12  5/12  6/12  7/12  8/12 9/12                       Evaluation        Progress note performed        Manual                                                                              Ther ex                       Towel slides x20  Shoulder shrugs x10, 2 sets  Shoulder shrugs x10, 2 sets   Towel slides x20 x20 Digital PROM composite flexion and extension x30 Digital PROM composite flexion and extension x30 Digital AAROM composite flexion and extension x10, 2 sets with 5-10 sec holds Digital AAROM composite flexion and extension x10, 2 sets with 5-10 sec holds Digital AAROM composite flexion and extension x10, 2 sets with 5-10 sec holds         Exer Stick grasp and flex/extend wrist x20, 2 sets       Hand-over hand velcro  removal in composite flexion (15 mins) Functional composite grasp to create a fist to grasp and transport cone and marbles from one bucket to another (10 mins) Functional composite grasp to create a fist to grasp and place large pegs into board (20 pegs) AROM velcro  removal in composite flexion (15 mins)  -coband on 4-5th digit for milana strapping Velcro  removal in composite flexion (10 mins)  -coband on 4-5th digit for milana strapping Velcro  removal in composite flexion (10 mins)  -coband on 4-5th digit for milana strapping Functional composite grasp to create a fist to cup and transport corn from one bucket to another (10 mins)       Cone stacking and unstacking (10 mins) Cone stacking and unstacking (10 mins) Prolonged cone grasp activity with marble transportation   Cone stacking and unstacking (10 mins) Functional Reaching onto board on wall, half of board completed (15 mins) [place] pincer grasp 10 mins Functional reaching grasping velcro blocks and hanging onto metal tree (15 mins)       PROM digital flexion with 10 second holds x20   Non-weight dowel AROM supination/pronation x10, 2 sets       Red 10# Flexbar bilateral supination/pronation x10, 2 sets       Tennis ball digital extension to roll it   Lumbrical strengthening through use of clamp to flex Mps with PIP/DIP  straight to grasp and transport cotton balls from table to container        Lumbrical strengthening through use of clamp to flex Mps with PIP/DIP straight to grasp and transport cotton balls from table to container (8 mins)           Supine Cane shoulder flexion and shoulder abduction x10, 2 sets   Supine Cane shoulder flexion and shoulder abduction x10, 2 sets Wall slides  shoulder flexion and shoulder abduction x10, 2 sets Wall slides  shoulder flexion and shoulder abduction x10, 2 sets                   Supine Cane shoulder flexion and shoulder abduction x10, 2 sets   HEP instruction towel slides                                     Therapeutic Activity                             Education and resources provided for [employer] [place] for neuro re-edu         Neuromuscular Re-education                      Mirror AROM Digit flex/extend, isolated finger extension, opposition, wrist extension  x20 Digit flex/extend,isolated finger extension, opposition, wrist extension  x20  Digit flex/extend,isolated finger extension, opposition, wrist extension  x20 Digit flex/extend,isolated finger extension, opposition, wrist extension  x30 Digit flex/extend,isolated finger extension, opposition, wrist extension  x30                           Modalities                                                              HEP:  Assignment date:   Shoulder and elbow flexion towel slides 1/18/24   Passive Composite finger flexion 1/31/24            Charges: TE 3 (40)  Total Timed Treatment: 40 min  Total Treatment Time: 40 min

## 2024-02-16 ENCOUNTER — OFFICE VISIT (OUTPATIENT)
Dept: SPEECH THERAPY | Facility: HOSPITAL | Age: 64
End: 2024-02-16
Attending: INTERNAL MEDICINE
Payer: COMMERCIAL

## 2024-02-16 PROCEDURE — 92507 TX SP LANG VOICE COMM INDIV: CPT

## 2024-02-16 NOTE — PROGRESS NOTES
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  Pt cannot sleep on that side.  Pt refused  so session conducted in English.      Objective:      Date: 2/9/2024  Tx#: 1/16 Date: 2/16/24  Tx#: 2/16 Date:   Tx#: 3/ Date:   Tx#: 4/ Date:   Tx#: 5/ Date:   Tx#: 6/ Date:   Tx#: 7/ Date:  Tx#: 8/   Generative naming  Word recall 10 items named but with max cues and 5 min per category. Naming item described  67% with mod cues    Describing item with 4 attributes  50% with max cues         Sentence production Producing full sentence given action picture 2/2 accounting for ESL. Given target word, produce full sentence  55%         Mather in multi sentences Pt's conversational language remains telegraphic. Pt uses some full sentences, but primarily telegraphic utterances.          Oral motor  Pt reports inability to open mouth fully.                        Assessment: Pt with significant difficulty providing attributes of objects and producing sentences of same objects, approximately 50% for both. Improvement noted with direct cues.  Naming of item described was better at 67%.  Occasional complete sentences produced in conversation, however, most output is telegraphic and listener is required to ask questions to understand massage.    Goals: (to be met in 16 visits)   Pt will improve generative naming to 12 items/minute.  Pt will produce sentences given picture stim or wh- question with 90% accuracy.  Pt will communicate wants, needs and recent events with 90% accuracy in sentences.  Pt will participate in oral [facility] exam to assess ROM and strength. met  Pt will participate in cognitive assessment as warranted.    Plan: Continue  therapy per specified goals    Skilled Services: speech language therapy    Charges: 55623         Total Treatment Time: 45 min    Taylor Powers MA/[facility]-SLP  Speech Language Pathologist  [facility]  [phone]

## 2024-02-19 ENCOUNTER — OFFICE VISIT (OUTPATIENT)
Dept: OCCUPATIONAL MEDICINE | Facility: HOSPITAL | Age: 64
End: 2024-02-19
Attending: INTERNAL MEDICINE
Payer: COMMERCIAL

## 2024-02-19 PROCEDURE — 97110 THERAPEUTIC EXERCISES: CPT

## 2024-02-19 NOTE — PROGRESS NOTES
Diagnosis:   Right arm weakness (R29.898)  History of ischemic cerebrovascular accident with residual deficit (I69.30)      Referring Provider: Joann  Date of Evaluation:    1/16/2024    Precautions:  Hx of CVA Next MD visit:   none scheduled  Date of Surgery: n/a  Date of CVA: June 2023    Insurance Primary/Secondary: BCBS OUT OF STATE / N/A     # Auth Visits: 12 visits 1/1- 4/1           Subjective: Pt states her R hand is a little better today. 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      Pain: present did not rate      Objective: See exercises flowsheet below for exercises performed.       Assessment: Pt session began with warming-up the R shoulder for functional reaching tasks. OT continued to encourage pt to use R hand during her daily routine as pt was just verbalizing participation in her exercises and min functional involvement. When intently activating her R hand for grasping activities, pt's R SF's extension tone kicks in. Pt able to fully  flexbar with all digits today. No pain verbalized in R shoulder today during activities      Goals:   Pt complaints of pain in right shoulder will decrease at worst to 1/10.-PROGRESSING improved to 5/10  Pt will be modified independent and compliant with comprehensive HEP to maintain progress achieved in OT.-MET, continued/will be updated  Pt will increase R shoulder flexion and abduction to 100 degrees for ease of dressing.-MET in Flexion, not abduction  Pt will decrease her QuickDASH score by 10 points to demo improved functional independence.   Updated/Additional Goal   Pt will increase their (R) SF ROMERO by 10-15 degrees for ease of holding a cup.   Pt will increase their (R) RF ROMERO by 10-15 degrees for ease of cupping medications      Plan: Last scheduled visit next session, discharge patient pending goal attainment    Date 1/18/24   1/22/24   1/24/24   1/31/24    2/5/24   2/7/24  2/13/2024   2/15/2024  2/19/2024    Visit # 2/12  3/12   4/12  5/12  6/12  7/12  8/12 9/12 10/12                        Evaluation        Progress note performed         Manual                                                                                 Ther ex                        Towel slides x20  Shoulder shrugs x10, 2 sets  Shoulder shrugs x10, 2 sets Shoulder shrugs x10, 2 sets 5 second holds   Towel slides x20 x20 Digital PROM composite flexion and extension x30 Digital PROM composite flexion and extension x30 Digital AAROM composite flexion and extension x10, 2 sets with 5-10 sec holds Digital AAROM composite flexion and extension x10, 2 sets with 5-10 sec holds Digital AAROM composite flexion and extension x10, 2 sets with 5-10 sec holds  Digital AAROM composite flexion and extension x10, 2 sets with 5-10 sec holds        Exer Stick grasp and flex/extend wrist x20, 2 sets    Wall slides  shoulder flexion and shoulder abduction x20, 2 sets    Hand-over hand velcro  removal in composite flexion (15 mins) Functional composite grasp to create a fist to grasp and transport cone and marbles from one bucket to another (10 mins) Functional composite grasp to create a fist to grasp and place large pegs into board (20 pegs) AROM velcro  removal in composite flexion (15 mins)  -coband on 4-5th digit for milana strapping Velcro  removal in composite flexion (10 mins)  -coband on 4-5th digit for milana strapping Velcro  removal in composite flexion (10 mins)  -coband on 4-5th digit for milana strapping Functional composite grasp to create a fist to cup and transport corn from one bucket to another (10 mins)        Cone stacking and unstacking (10 mins) Cone stacking and unstacking (10 mins) Prolonged cone grasp activity with marble transportation   Cone stacking and unstacking (10 mins) Functional Reaching onto board on wall, half of board completed (15 mins) [place] pincer grasp 10 mins Functional reaching grasping velcro blocks and hanging onto  metal tree (15 mins) Functional reaching grasping velcro blocks and hanging onto metal tree (15 mins)       PROM digital flexion with 10 second holds x20   Non-weight dowel AROM supination/pronation x10, 2 sets       Red 10# Flexbar bilateral supination/pronation x10, 2 sets Red 10# Flexbar bilateral supination/pronation  -ipsilateral sup/pronation   x10, 2 sets       Tennis ball digital extension to roll it   Lumbrical strengthening through use of clamp to flex Mps with PIP/DIP straight to grasp and transport cotton balls from table to container        Lumbrical strengthening through use of clamp to flex Mps with PIP/DIP straight to grasp and transport cotton balls from table to container (8 mins) Lumbrical strengthening through use of clamp to flex Mps with PIP/DIP straight to grasp and transport cotton balls from table to container (8 mins)           Supine Cane shoulder flexion and shoulder abduction x10, 2 sets   Supine Cane shoulder flexion and shoulder abduction x10, 2 sets Wall slides  shoulder flexion and shoulder abduction x10, 2 sets Wall slides  shoulder flexion and shoulder abduction x10, 2 sets                    Supine Cane shoulder flexion and shoulder abduction x10, 2 sets    HEP instruction towel slides                                       Therapeutic Activity                              Education and resources provided for [employer] [place] for neuro re-edu          Neuromuscular Re-education                       Mirror AROM Digit flex/extend, isolated finger extension, opposition, wrist extension  x20 Digit flex/extend,isolated finger extension, opposition, wrist extension  x20  Digit flex/extend,isolated finger extension, opposition, wrist extension  x20 Digit flex/extend,isolated finger extension, opposition, wrist extension  x30 Digit flex/extend,isolated finger extension, opposition, wrist extension  x30                             Modalities                                                                  HEP:  Assignment date:   Shoulder and elbow flexion towel slides 1/18/24   Passive Composite finger flexion 1/31/24            Charges: TE 3 (40)  Total Timed Treatment: 40 min  Total Treatment Time: 40 min

## 2024-02-21 ENCOUNTER — OFFICE VISIT (OUTPATIENT)
Dept: OCCUPATIONAL MEDICINE | Facility: HOSPITAL | Age: 64
End: 2024-02-21
Attending: INTERNAL MEDICINE
Payer: COMMERCIAL

## 2024-02-21 PROCEDURE — 97110 THERAPEUTIC EXERCISES: CPT

## 2024-02-21 NOTE — PROGRESS NOTES
Diagnosis:   Right arm weakness (R29.898)  History of ischemic cerebrovascular accident with residual deficit (I69.30)      Referring Provider: Joann  Date of Evaluation:    1/16/2024    Precautions:  Hx of CVA Next MD visit:   none scheduled  Date of Surgery: n/a  Date of CVA: June 2023    Insurance Primary/Secondary: BCBS OUT OF STATE / N/A     # Auth Visits: 12 visits 1/1- 4/1             Discharge Summary  Pt has attended 11, cancelled 0, and no shown 1 visits in Occupational Therapy.     Subjective: Pt verbalized compliance with her HEP. Pt continues to report high levels of pain within the R shoulder.     Assessment: Pt has been seen for 11 skilled OT visits in which she has worked to increase her RUE functional mobility. Pt's biggest barrier towards greater shoulder AROM is her pain. OT has advised pt to return to doctor for further evaluation of shoulder pain. At this time pt has met 5/6 goals. Pt able to make full functional grasp for manipulation of small items. OT encouraged pt to continue to work towards using her R hand for more functional tasks as she tends to neglect it. Pt and OT discussed and agreed upon discharge at this time. Pt discharge from OT caseload.      Objective Data:     Shoulder Eval Shoulder 02/05/24 Shoulder Today   Flexion: R 78; L 145  Abduction: R 85; L 152    Flexion: R 105  Abduction: R 59    Shoulder flexion: R 115  Shoulder Abduction: R 95      AROM:(Degrees)  RIGHT HAND:     RF Eval RF 02/05/24 RF Today SF Eval SF 02/05/24 SF Today   MP 0/0 0/10 0/40 0/0 0/30 0/50   PIP -45/65 -30/75 0/90 0/0 0/25 0/60   DIP 0/0 0/0 0/20 0/0 0/0 0/30   ROMERO 20 45 150 0 55 140       Goals:   Pt complaints of pain in right shoulder will decrease at worst to 1/10.-NOT MET, consistently staying at a 5/10  Pt will be modified independent and compliant with comprehensive HEP to maintain progress achieved in OT.-MET, continued/will be updated  Pt will increase R shoulder flexion and abduction to 100  degrees for ease of dressing.-MET in Flexion, not abduction  Pt will decrease her QuickDASH score by 10 points to demo improved functional independence.-MET   Updated/Additional Goal   Pt will increase their (R) SF ROMERO by 10-15 degrees for ease of holding a cup.-MET   Pt will increase their (R) RF ROMERO by 10-15 degrees for ease of cupping medications-MET      Plan: Discharge pt to continue on with HEP     Patient/Family/Caregiver was advised of these findings, precautions, and treatment options and has agreed to actively participate in planning and for this course of care.    Thank you for your referral. If you have any questions, please contact me at Dept: [phone].    Sincerely,  Electronically signed by therapist: Sonali Flores MS, OTR/L     Physician's certification required: No  Please co-sign or sign and return this letter via fax as soon as possible to [phone].   I certify the need for these services furnished under this plan of treatment and while under my care.    X___________________________________________________ Date____________________    Certification From: 2/21/2024  To:5/21/2024                Treatment Objective: See exercises flowsheet below for exercises performed.     Date 1/18/24   1/22/24   1/24/24   1/31/24    2/5/24   2/7/24  2/13/2024   2/15/2024  2/19/2024  2/21/2024    Visit # 2/12  3/12  4/12  5/12  6/12  7/12  8/12 9/12 10/12 11/12                         Evaluation        Progress note performed          Manual                                                                                     Ther ex                         Towel slides x20  Shoulder shrugs x10, 2 sets  Shoulder shrugs x10, 2 sets Shoulder shrugs x10, 2 sets 5 second holds    Towel slides x20 x20 Digital PROM composite flexion and extension x30 Digital PROM composite flexion and extension x30 Digital AAROM composite flexion and extension x10, 2 sets with 5-10 sec holds Digital AAROM composite flexion and  extension x10, 2 sets with 5-10 sec holds Digital AAROM composite flexion and extension x10, 2 sets with 5-10 sec holds  Digital AAROM composite flexion and extension x10, 2 sets with 5-10 sec holds Digital AAROM composite flexion and extension x10, 2 sets with 5-10 sec holds        Exer Stick grasp and flex/extend wrist x20, 2 sets    Wall slides  shoulder flexion and shoulder abduction x20, 2 sets Wall slides  shoulder flexion and shoulder abduction x20, 2 sets    Hand-over hand velcro  removal in composite flexion (15 mins) Functional composite grasp to create a fist to grasp and transport cone and marbles from one bucket to another (10 mins) Functional composite grasp to create a fist to grasp and place large pegs into board (20 pegs) AROM velcro  removal in composite flexion (15 mins)  -coband on 4-5th digit for milana strapping Velcro  removal in composite flexion (10 mins)  -coband on 4-5th digit for milana strapping Velcro  removal in composite flexion (10 mins)  -coband on 4-5th digit for milana strapping Functional composite grasp to create a fist to cup and transport corn from one bucket to another (10 mins)         Cone stacking and unstacking (10 mins) Cone stacking and unstacking (10 mins) Prolonged cone grasp activity with marble transportation   Cone stacking and unstacking (10 mins) Functional Reaching onto board on wall, half of board completed (15 mins) [place] pincer grasp 10 mins Functional reaching grasping velcro blocks and hanging onto metal tree (15 mins) Functional reaching grasping velcro blocks and hanging onto metal tree (15 mins)        PROM digital flexion with 10 second holds x20   Non-weight dowel AROM supination/pronation x10, 2 sets       Red 10# Flexbar bilateral supination/pronation x10, 2 sets Red 10# Flexbar bilateral supination/pronation  -ipsilateral sup/pronation   x10, 2 sets Red 10# Flexbar bilateral supination/pronation  -ipsilateral  sup/pronation   x10, 2 sets       Tennis ball digital extension to roll it   Lumbrical strengthening through use of clamp to flex Mps with PIP/DIP straight to grasp and transport cotton balls from table to container        Lumbrical strengthening through use of clamp to flex Mps with PIP/DIP straight to grasp and transport cotton balls from table to container (8 mins) Lumbrical strengthening through use of clamp to flex Mps with PIP/DIP straight to grasp and transport cotton balls from table to container (8 mins)            Supine Cane shoulder flexion and shoulder abduction x10, 2 sets   Supine Cane shoulder flexion and shoulder abduction x10, 2 sets Wall slides  shoulder flexion and shoulder abduction x10, 2 sets Wall slides  shoulder flexion and shoulder abduction x10, 2 sets                     Supine Cane shoulder flexion and shoulder abduction x10, 2 sets  Supine Cane shoulder flexion and shoulder abduction x10, 2 sets   HEP instruction towel slides                                         Therapeutic Activity                               Education and resources provided for [employer] [place] for neuro re-edu           Neuromuscular Re-education                        Mirror AROM Digit flex/extend, isolated finger extension, opposition, wrist extension  x20 Digit flex/extend,isolated finger extension, opposition, wrist extension  x20  Digit flex/extend,isolated finger extension, opposition, wrist extension  x20 Digit flex/extend,isolated finger extension, opposition, wrist extension  x30 Digit flex/extend,isolated finger extension, opposition, wrist extension  x30                               Modalities                                                                    HEP:  Assignment date:   Shoulder and elbow flexion towel slides 1/18/24   Passive Composite finger flexion 1/31/24            Charges: TE 3 (38)  Total Timed Treatment: 38 min  Total Treatment Time: 45 min

## 2024-02-23 ENCOUNTER — OFFICE VISIT (OUTPATIENT)
Dept: SPEECH THERAPY | Facility: HOSPITAL | Age: 64
End: 2024-02-23
Attending: INTERNAL MEDICINE
Payer: COMMERCIAL

## 2024-02-23 PROCEDURE — 92507 TX SP LANG VOICE COMM INDIV: CPT

## 2024-02-23 NOTE — PROGRESS NOTES
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  It was difficult so her  helped her.   Pt speaks English at home as her  and son do not know [place].       Pain: 5/10 shoulder    Objective:      Date: 2/9/2024  Tx#: 1/16 Date: 2/16/24  Tx#: 2/16 Date: 2/23/24  Tx#: 3/16 Date:   Tx#: 4/ Date:   Tx#: 5/ Date:   Tx#: 6/ Date:   Tx#: 7/ Date:  Tx#: 8/   Generative naming  Word recall 10 items named but with max cues and 5 min per category. Naming item described  67% with mod cues    Describing item with 4 attributes  50% with max cues Naming 5 items per category:  40%   3 words per category: 80%        Sentence production Producing full sentence given action picture 2/2 accounting for ESL. Given target word, produce full sentence  55% Producing complete sentence about picture: 50% without cues, 100% with cues.        [place] in multi sentences Pt's conversational language remains telegraphic. Pt uses some full sentences, but primarily telegraphic utterances.  Pt uses some wrong words and omits prepositions which reduces communicative intent.  Listener needs to ask questions to understand content.        Oral motor  Pt reports inability to open mouth fully.                        Assessment: Pt's verbal output is improving in that is increasing in amount but it is still fairly telegraphic in nature.  Listener still caries the burden of asking questions to clarify communicative intent during conversation.  Omission and wrong choice of prepositions is common.  It is unknown if the patient used prepositions accurately prior to her CVA as English is her second language.  English is her main language at home for the past 24  years.  Pt able to generate a complete sentence with 50% accuracy without concern for preposition choice.  Generative naming remains difficult.  Pt can usually name 3 items without pressure or limit on time.      Goals: (to be met in 16 visits)   Pt will improve generative naming to 12 items/minute.  Pt will produce sentences given picture stim or wh- question with 90% accuracy.  Pt will communicate wants, needs and recent events with 90% accuracy in sentences.  Pt will participate in oral [facility] exam to assess ROM and strength. met  Pt will participate in cognitive assessment as warranted.    Plan: Continue therapy per specified goals    Skilled Services: speech language therapy    Charges: [id number]         Total Treatment Time: 45 min    Taylor Powers MA/DEMETRIO-SLP  Speech Language Pathologist  [facility]  [phone]

## 2024-03-01 ENCOUNTER — OFFICE VISIT (OUTPATIENT)
Dept: SPEECH THERAPY | Facility: HOSPITAL | Age: 64
End: 2024-03-01
Attending: INTERNAL MEDICINE
Payer: COMMERCIAL

## 2024-03-01 PROCEDURE — 92507 TX SP LANG VOICE COMM INDIV: CPT

## 2024-03-01 NOTE — PROGRESS NOTES
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 Pt c/o significant shoulder and upper arm pain.  She sometimes takes Tylenol for the pain.  She was discharged from OT last week who recommended following up with PCP regarding pain.  She has an appointment with her PCP 3/7/24.    Pain: 9/10 shoulder    Objective:      Date: 2/9/2024  Tx#: 1/16 Date: 2/16/24  Tx#: 2/16 Date: 2/23/24  Tx#: 3/16 Date: 3/1/24  Tx#: 4/16 Date:   Tx#: 5/16   Generative naming  Word recall 10 items named but with max cues and 5 min per category. Naming item described  67% with mod cues    Describing item with 4 attributes  50% with max cues Naming 5 items per category:  40%   3 words per category: 80% Naming 5 members per concrete category: 100%    Sentence production Producing full sentence given action picture 2/2 accounting for ESL. Given target word, produce full sentence  55% Producing complete sentence about picture: 50% without cues, 100% with cues. Given target word, produce full sentence   100% but with article and preposition omissions and word tense errors, however, this may be premorbid as English is her second language.    [place] in multi sentences Pt's conversational language remains telegraphic. Pt uses some full sentences, but primarily telegraphic utterances.  Pt uses some wrong words and omits prepositions which reduces communicative intent.  Listener needs to ask questions to understand content. Significantly more expressive language output.    Oral motor  Pt reports inability to open mouth fully.                Assessment: Pt's verbal output is improving in that is increasing in amount and is less telegraphic.  Some preposition and article omissions and  wrong tense, but this may be premorbid as English is patient's second language.  Less listener burden to ask clarification questions, however, still is necessary at times.  Thought flexibility and word recall is improving in that generative naming for 5 items improved from 50% to 100% this session.  Complete sentence production also improved from 50% to 100% without concern for omission of articles, prepositions and word tense.          Goals: (to be met in 16 visits)   Pt will improve generative naming to 12 items/minute.  Pt will produce sentences given picture stim or wh- question with 90% accuracy.  Pt will communicate wants, needs and recent events with 90% accuracy in sentences.  Pt will participate in oral [facility] exam to assess ROM and strength. met  Pt will participate in cognitive assessment as warranted.    Plan: Continue therapy per specified goals    Skilled Services: speech language therapy    Charges: [id number]         Total Treatment Time: 45 min    Taylor Powers MA/DEMETRIO-SLP  Speech Language Pathologist  [facility]  [phone]

## 2024-03-08 ENCOUNTER — OFFICE VISIT (OUTPATIENT)
Dept: SPEECH THERAPY | Facility: HOSPITAL | Age: 64
End: 2024-03-08
Attending: INTERNAL MEDICINE
Payer: COMMERCIAL

## 2024-03-08 PROCEDURE — 92507 TX SP LANG VOICE COMM INDIV: CPT

## 2024-03-08 NOTE — PROGRESS NOTES
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  She hasn't started taking it yet.  Pt feels she has memory problems.  She doesn't remember why she walked into a room, she forgot to  medicine.  She forgot her HEP folder today.    Pain: 8/10 shoulder    Objective:      Date: 2/9/2024  Tx#: 1/16 Date: 2/16/24  Tx#: 2/16 Date: 2/23/24  Tx#: 3/16 Date: 3/1/24  Tx#: 4/16 Date: 3/8/24  Tx#: 5/16   Generative naming  Word recall 10 items named but with max cues and 5 min per category. Naming item described  67% with mod cues    Describing item with 4 attributes  50% with max cues Naming 5 items per category:  40%   3 words per category: 80% Naming 5 members per concrete category: 100% Describing objects: 40%, 60% with cues    Naming objects described:  80%   Sentence production Producing full sentence given action picture 2/2 accounting for ESL. Given target word, produce full sentence  55% Producing complete sentence about picture: 50% without cues, 100% with cues. Given target word, produce full sentence   100% but with article and preposition omissions and word tense errors, however, this may be premorbid as English is her second language. 60% acc for sentence production given action picture.   [place] in multi sentences Pt's conversational language remains telegraphic. Pt uses some full sentences, but primarily telegraphic utterances.  Pt uses some wrong words and omits prepositions which reduces communicative intent.  Listener needs to ask questions to understand content. Significantly more expressive language output. Output somewhat telegraphic today.  Listener was required to ask questions to fully understand.      Oral motor  Pt reports inability to open mouth fully.                Assessment: Last session the Pt's verbal output was less telegraphic.  This session she was struggling more with forming complete sentences, even with consideration of English as a second language (preposition and article omissions and wrong tense).  I had to ask frequent clarification questions.  Naming from description improved this session.        Goals: (to be met in 16 visits)   Pt will improve generative naming to 12 items/minute.  Pt will produce sentences given picture stim or wh- question with 90% accuracy.  Pt will communicate wants, needs and recent events with 90% accuracy in sentences.  Pt will participate in oral [facility] exam to assess ROM and strength. met  Pt will participate in cognitive assessment as warranted.    Plan: Continue therapy per specified goals    Skilled Services: speech language therapy    Charges: [id number]         Total Treatment Time: 45 min    Taylor Powers MA/DEMETRIO-SLP  Speech Language Pathologist  [facility]  [phone]

## 2024-03-15 ENCOUNTER — OFFICE VISIT (OUTPATIENT)
Dept: SPEECH THERAPY | Facility: HOSPITAL | Age: 64
End: 2024-03-15
Attending: INTERNAL MEDICINE
Payer: COMMERCIAL

## 2024-03-15 PROCEDURE — 92507 TX SP LANG VOICE COMM INDIV: CPT

## 2024-03-15 NOTE — PROGRESS NOTES
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    Memory problems persist per patient.      Pain: 7/10 shoulder    Objective:      Date: 2/9/2024  Tx#: 1/16 Date: 2/16/24  Tx#: 2/16 Date: 2/23/24  Tx#: 3/16 Date: 3/1/24  Tx#: 4/16 Date: 3/8/24  Tx#: 5/16   Generative naming  Word recall 10 items named but with max cues and 5 min per category. Naming item described  67% with mod cues    Describing item with 4 attributes  50% with max cues Naming 5 items per category:  40%   3 words per category: 80% Naming 5 members per concrete category: 100% Describing objects: 40%, 60% with cues    Naming objects described:  80%   Sentence production Producing full sentence given action picture 2/2 accounting for ESL. Given target word, produce full sentence  55% Producing complete sentence about picture: 50% without cues, 100% with cues. Given target word, produce full sentence   100% but with article and preposition omissions and word tense errors, however, this may be premorbid as English is her second language. 60% acc for sentence production given action picture.   [place] in multi sentences Pt's conversational language remains telegraphic. Pt uses some full sentences, but primarily telegraphic utterances.  Pt uses some wrong words and omits prepositions which reduces communicative intent.  Listener needs to ask questions to understand content. Significantly more expressive language output. Output somewhat telegraphic today.  Listener was required to ask questions to fully understand.     Oral motor  Pt reports inability to open mouth fully.                   3/15/24  6/16   Generative naming  Word recall  Naming 5 words per category 100%    Per minute:  6/7     Sentence production 3/3   [place] in multi sentences    Oral motor              Assessment: Pt c/o memory difficulties, therefore, MoCA was administered.  See results below.  Many difficulties may be due to English as a second language.  Will add goal for memory.  Generative naming is stable from initial evaluation.      MoCA  Vis spatial/exec fxn 4/5  Naming  2/3  Attention  5/6  Language  1/3  Abstraction  0/2  Memory  3/5  Orientation  5/6  Total:   20/30    Goals: (to be met in 16 visits)   Pt will improve generative naming to 12 items/minute.  Pt will produce sentences given picture stim or wh- question with 90% accuracy.  Pt will communicate wants, needs and recent events with 90% accuracy in sentences.  Pt [facility] to [facility] ROM and strength. met  Pt will participate in cognitive assessment as warranted. met  Pt will recall 5 pieces of information with 90% acc after delay.      Plan: Continue therapy per specified goals    Skilled Services: speech language therapy    Charges: [id number]         Total Treatment Time: 45 min    Taylor Powers MA/CCC-SLP  Speech Language Pathologist  [facility]  [phone]

## 2024-03-22 ENCOUNTER — APPOINTMENT (OUTPATIENT)
Dept: SPEECH THERAPY | Facility: HOSPITAL | Age: 64
End: 2024-03-22
Attending: INTERNAL MEDICINE
Payer: COMMERCIAL

## 2024-03-25 ENCOUNTER — HOSPITAL ENCOUNTER (OUTPATIENT)
Dept: GENERAL RADIOLOGY | Facility: HOSPITAL | Age: 64
Discharge: HOME | End: 2024-03-25
Attending: ORTHOPAEDIC SURGERY
Payer: COMMERCIAL

## 2024-03-25 ENCOUNTER — OFFICE VISIT (OUTPATIENT)
Dept: ORTHOPEDICS CLINIC | Facility: CLINIC | Age: 64
End: 2024-03-25
Payer: COMMERCIAL

## 2024-03-25 VITALS
HEIGHT: 61 IN | DIASTOLIC BLOOD PRESSURE: 93 MMHG | HEART RATE: 66 BPM | BODY MASS INDEX: 23.03 KG/M2 | SYSTOLIC BLOOD PRESSURE: 157 MMHG | WEIGHT: 122 LBS

## 2024-03-25 DIAGNOSIS — R52 PAIN: ICD-10-CM

## 2024-03-25 DIAGNOSIS — Z47.89 ORTHOPEDIC AFTERCARE: ICD-10-CM

## 2024-03-25 DIAGNOSIS — R52 PAIN: Primary | ICD-10-CM

## 2024-03-25 DIAGNOSIS — M75.51 SUBACROMIAL BURSITIS OF RIGHT SHOULDER JOINT: ICD-10-CM

## 2024-03-25 PROCEDURE — 73030 X-RAY EXAM OF SHOULDER: CPT | Performed by: ORTHOPAEDIC SURGERY

## 2024-03-25 RX ORDER — TRIAMCINOLONE ACETONIDE 40 MG/ML
40 INJECTION, SUSPENSION INTRA-ARTICULAR; INTRAMUSCULAR ONCE
Status: COMPLETED | OUTPATIENT
Start: 2024-03-25 | End: 2024-03-25

## 2024-03-25 RX ORDER — FEXOFENADINE HCL 180 MG/1
180 TABLET ORAL DAILY
COMMUNITY
Start: 2024-03-07

## 2024-03-25 RX ORDER — CELECOXIB 200 MG/1
200 CAPSULE ORAL DAILY
COMMUNITY
Start: 2024-03-07

## 2024-03-25 RX ORDER — GABAPENTIN 100 MG/1
CAPSULE ORAL
COMMUNITY
Start: 2024-03-07

## 2024-03-25 RX ADMIN — TRIAMCINOLONE ACETONIDE 40 MG: 40 INJECTION, SUSPENSION INTRA-ARTICULAR; INTRAMUSCULAR at 15:41:00

## 2024-03-25 NOTE — PROGRESS NOTES
Per verbal order from Dr. Farrell, draw up and 4ml of 0.5% Marcaine and 1ml of Kenalog 40 for injection into rightshoulder Xenia W, CMA  Patient provided education handout for cortisone injection.

## 2024-03-25 NOTE — PROGRESS NOTES
NURSING INTAKE COMMENTS:   Chief Complaint   Patient presents with    Shoulder Pain     Pt here for Right shoulder pain , ongoing for 2 months . Rates pain a 9.        HPI: This 64 year old female presents today with complaints of right shoulder pain that began insidiously about 2 months ago.  There is no antecedent history of trauma.  The pain is over the trapezius and rating the lateral aspect of the shoulder.    Past Medical History:   Diagnosis Date    Essential hypertension      History reviewed. No pertinent surgical history.  Current Outpatient Medications   Medication Sig Dispense Refill    celecoxib 200 MG Oral Cap Take 1 capsule (200 mg total) by mouth daily.      fexofenadine 180 MG Oral Tab Take 1 tablet (180 mg total) by mouth daily.      gabapentin 100 MG Oral Cap One in AM and 2 at bed time      amLODIPine 10 MG Oral Tab Take 1 tablet (10 mg total) by mouth daily. 30 tablet 0    aspirin 81 MG Oral Tab EC Take 1 tablet (81 mg total) by mouth daily. 30 tablet 0    atorvastatin 40 MG Oral Tab Take 1 tablet (40 mg total) by mouth nightly. 30 tablet 0    clopidogrel 75 MG Oral Tab Take 1 tablet (75 mg total) by mouth daily. 30 tablet 0    losartan 100 MG Oral Tab Take 1 tablet (100 mg total) by mouth daily. 30 tablet 0     Allergies   Allergen Reactions    Semaglutide NAUSEA AND VOMITING    Amlodipine OTHER (SEE COMMENTS)     CNS effects    Glipizide OTHER (SEE COMMENTS)    Naproxen UNKNOWN    Sumatriptan UNKNOWN    Treximet UNKNOWN     History reviewed. No pertinent family history.    Social History     Occupational History    Not on file   Tobacco Use    Smoking status: Never    Smokeless tobacco: Never   Vaping Use    Vaping Use: Never used   Substance and Sexual Activity    Alcohol use: Never    Drug use: Never    Sexual activity: Not on file        Review of Systems:  GENERAL: feels generally well, no significant weight loss or weight gain  SKIN: no ulcerated or worrisome skin lesions  EYES:denies  blurred vision or double vision  HEENT: denies new nasal congestion, sinus pain or ST  LUNGS: denies shortness of breath  CARDIOVASCULAR: denies chest pain  GI: no hematemesis, no worsening heartburn, no diarrhea  : no dysuria, no blood in urine, no difficulty urinating, no incontinence  MUSCULOSKELETAL: no other musculoskeletal complaints other than in HPI  NEURO: no numbness or tingling, no weakness or balance disorder  PSYCHE: no depression or anxiety  HEMATOLOGIC: no hx of blood dyscrasia  ENDOCRINE: no thyroid or diabetes issues  ALL/ASTHMA: no new hx of severe allergy or asthma    Physical Examination:    Ht 5' 1\" (1.549 m)   Wt 122 lb (55.3 kg)   BMI 23.05 kg/m²   Constitutional: appears well hydrated, alert and responsive, no acute distress noted  Extremities: Dyssynchronous motion of the right shoulder.  Positive impingement sign.  No weakness resisted external rotation at neutral.      Imaging: No results found.     Lab Results   Component Value Date    WBC 4.5 01/05/2024    HGB 10.4 (L) 01/05/2024    .0 01/05/2024      Lab Results   Component Value Date    GLU 88 01/05/2024    BUN 6 (L) 01/05/2024    CREATSERUM 0.55 01/05/2024        Assessment and Plan:  Diagnoses and all orders for this visit:    Pain  -     XR SHOULDER, COMPLETE (MIN 2 VIEWS), RIGHT (CPT=73030); Future    Subacromial bursitis of right shoulder joint  -     arthrocentesis major joint  -     triamcinolone acetonide (Kenalog-40) 40 MG/ML injection 40 mg    Other orders  -     Cancel: XR SHOULDER, COMPLETE (MIN 2 VIEWS), RIGHT (CPT=73030); Future        Assessment: Subacromial bursitis of the right shoulder.    Procedure: The risks and benefits of a cortisone injection were discussed with the patient.  An informational sheet was also provided and the patient had ample time to review it.  Under sterile preparation, the right shoulder was injected with 40 mg of Kenalog and 4 cc 0.5% marcaine.  The patient tolerated the procedure  well.      Plan: She is taking Celebrex.  I injected the subacromial space and prescribed physical therapy.  Follow-up in 6 weeks if symptoms persist and consider MRI scan at that time.    The above note was creating using Dragon speech recognition technology. Please excuse any typos.    MART HODGES MD

## 2024-03-29 ENCOUNTER — APPOINTMENT (OUTPATIENT)
Dept: SPEECH THERAPY | Facility: HOSPITAL | Age: 64
End: 2024-03-29
Attending: INTERNAL MEDICINE
Payer: COMMERCIAL

## 2024-04-03 ENCOUNTER — APPOINTMENT (OUTPATIENT)
Dept: PHYSICAL THERAPY | Age: 64
End: 2024-04-03
Attending: ORTHOPAEDIC SURGERY
Payer: COMMERCIAL

## 2024-04-05 ENCOUNTER — APPOINTMENT (OUTPATIENT)
Dept: PHYSICAL THERAPY | Age: 64
End: 2024-04-05
Attending: ORTHOPAEDIC SURGERY
Payer: COMMERCIAL

## 2024-04-05 ENCOUNTER — APPOINTMENT (OUTPATIENT)
Dept: SPEECH THERAPY | Facility: HOSPITAL | Age: 64
End: 2024-04-05
Attending: INTERNAL MEDICINE
Payer: COMMERCIAL

## 2024-04-10 ENCOUNTER — APPOINTMENT (OUTPATIENT)
Dept: PHYSICAL THERAPY | Age: 64
End: 2024-04-10
Attending: ORTHOPAEDIC SURGERY
Payer: COMMERCIAL

## 2024-04-12 ENCOUNTER — OFFICE VISIT (OUTPATIENT)
Dept: PHYSICAL THERAPY | Age: 64
End: 2024-04-12
Attending: ORTHOPAEDIC SURGERY
Payer: COMMERCIAL

## 2024-04-12 ENCOUNTER — APPOINTMENT (OUTPATIENT)
Dept: SPEECH THERAPY | Facility: HOSPITAL | Age: 64
End: 2024-04-12
Attending: INTERNAL MEDICINE
Payer: COMMERCIAL

## 2024-04-12 PROCEDURE — 97162 PT EVAL MOD COMPLEX 30 MIN: CPT

## 2024-04-12 NOTE — PROGRESS NOTES
UPPER EXTREMITY EVALUATION:   Referring Physician: Dr. Farrell  Date of Onset: 3 months ago Date of Service: 4/12/2024   Diagnosis: Subacromial bursitis of right shoulder joint (M75.51)     PATIENT SUMMARY:   Mitch Wick is a 64 year old y/o female who presents to therapy today with complaints of R shoulder pain  Pt describes pain level 8/10 constant.   History of current condition: Patient reports onset RUE weakness and pain 3 months ago after having a stroke. Has gone to OT for RUE weakness, still having shoulder pain. Not doing OT or speech anymore. States pain goes down her whole arm and has trouble closing her fingers on her R hand. Patient is right handed. Has to take pain medication to sleep.  Current functional limitations include reaching, lifting, writing, sleeping  Mitch describes prior level of function as WNL with no hx of R shoulder pain prior to 3 months ago. Pt goals include reducing pain.  Past medical history was reviewed with Mitch. Significant findings include hypertension, diabetes, CVA, depressive disorder         ASSESSMENT:   Patient presents to PT with onset R shoulder pain following CVA 3 months ago. Patient demos decreased R shoulder AROM, decreased R shoulder strength, poor posture, and biceps tendon tenderness. These deficits are contributing to difficulty with reaching, lifting, sleeping and writing. Evaluation was limited due to patient arriving late. Plan to assess remaining components at next visit.   Mitch would benefit from skilled Physical Therapy to address the above impairments to allow for improved functional mobility with decreased pain.    Precautions: None     OBJECTIVE:   Evaluation limited due to patient arriving 20 minutes late for appointment    Observation/Posture: B shoulders depressed/rounded    AROM:  L shoulder: flex 145, abd 155, IR to T9, ER 90  R shoulder: flex 125, abd 123, IR to R PSIS, ER 45      Strength/MMT:  Shoulder Elbow Scapular   Flexion: R 3+/5; L  4/5  Abduction: R 3-/5; L 4-/5  ER: R 3/5; L 4-/5  IR: R 3+/5; L 4/5 Flexion: R 4/5; L 4+/5  Extension: R 3+/5; L 4+/5   Not formally tested     Unable to make a fist on R ( strength not formally tested)    Palpation: tenderness R biceps tendon  Sensation: intact       Today’s Treatment and Response: Patient education provided on relevant anatomy, purpose/plans/goals of PT   No HEP issued this date due to patient late arrival - will initiate next visit  Charges: PT Eval x1 (mod complexity)   Total Timed Treatment: 0 min     Total Treatment Time: 25 min     In agreement with evaluation findings and clinical rationale, this evaluation involved Moderate Complexity decision making due to 3+ personal factors/comorbidities, 3 body structures involved/activity limitations, and evolving symptoms including changing pain levels.          PLAN OF CARE:    Goals (to be achieved in 10 visits):    1. Patient will be independent with HEP and it's progression  2. Patient will demo R shoulder AROM at least: flex/abd 150 to be able to reach into cabinets  3. Patient will demo R shoulder strength at least 4/5 to be able to lift a small bag of groceries.  5. Patient will report decrease in symptoms by 50% or better in terms of frequency and intensity to be able to sleep without disruption from pain    Frequency / Duration: Patient will be seen for 1-2 x/week or a total of 10 visits over a 90 day period. Treatment will include: Manual Therapy; Therapeutic Exercises; Neuromuscular Re-education; Therapeutic Activity; Electrical Stim; Ultrasound; Patient education; Home exercise program instruction    Education or treatment limitation: Communication (Patient states she speaks and understands English, declines , however communication during evaluation difficult. Unclear if due to language barrier or speech deficits from CVA)    Rehab Potential: fair    QuickDASH Outcome Score  Score: 81.82 % (1/15/2024  4:58 PM)      Patient  was advised of these findings, precautions, and treatment options and has agreed to actively participate in planning and for this course of care.    Thank you for your referral. Please co-sign or sign and return this letter via fax as soon as possible to [phone]. If you have any questions, please contact me at Dept: [phone]    Sincerely,  Electronically signed by therapist: Dm Christianson PT    Physician's certification required: Yes  I certify the need for these services furnished under this plan of treatment and while under my care.    X___________________________________________________ Date____________________    Certification From: 4/10/2024  To:7/9/2024

## 2024-04-16 ENCOUNTER — OFFICE VISIT (OUTPATIENT)
Dept: PHYSICAL THERAPY | Age: 64
End: 2024-04-16
Attending: ORTHOPAEDIC SURGERY
Payer: COMMERCIAL

## 2024-04-16 PROCEDURE — 97110 THERAPEUTIC EXERCISES: CPT

## 2024-04-16 NOTE — PROGRESS NOTES
Dx: Subacromial bursitis of right shoulder joint (M75.51)          Authorized # of Visits:  8  ([employer], 24 visits PT/OT/SLP 1/1-5/1/24, 8 remaining)         Next MD visit: none scheduled  Fall Risk: standard         Precautions: n/a           Date of evaluation: 4/12/24  Referring physician: Dr. Farrell  Subjective: Patient reports her shoulder is still hurting. Rates pain 7/10 currently. Patient reports she has been having a lot of trouble with her speech and memory since the stroke. Still receiving speech therapy for this.     Objective:   See flow chart below  R GH mobility WNL inferior, posterior glides, min loss anterior glides      Assessment: Focus of session on gentle ROM of R shoulder. Somewhat limited by communication barrier secondary to language deficits from stroke. Patient has pain with all shoulder movements but is able to complete all exercises.     Goals (to be achieved in 10 visits):    1. Patient will be independent with HEP and it's progression  2. Patient will demo R shoulder AROM at least: flex/abd 150 to be able to reach into cabinets  3. Patient will demo R shoulder strength at least 4/5 to be able to lift a small bag of groceries.  5. Patient will report decrease in symptoms by 50% or better in terms of frequency and intensity to be able to sleep without disruption from pain      Plan: Continue PT for R shoulder ROM, strengthening, HEP    Visit #2  Date: 4/16/24 Visit #3  Date:  Visit #4  Date:   TherEx:  PROM R shoulder all planes x 7 min  Supine cane flexion 20x  Supine cane chest press 20x  S/L ER with towel in axilla 20x  S/L abd AROM 20x  Seated scap retraction 20x5\"   Flexion wall slides 20x  Wall circles 10x CW/CCW  Narrow row with RTB 20x  Sh ext with RTB 20x  Standing flexion/abd to 90 10x ea              HEP:  Added supine cane flexion, S/L ER, S/L abd, flexion wall slides HEP: HEP:       Charges: TE x 3       Total Timed Treatment: 38 min  Total Treatment Time: 38 min

## 2024-04-17 ENCOUNTER — APPOINTMENT (OUTPATIENT)
Dept: PHYSICAL THERAPY | Age: 64
End: 2024-04-17
Attending: ORTHOPAEDIC SURGERY
Payer: COMMERCIAL

## 2024-04-26 ENCOUNTER — OFFICE VISIT (OUTPATIENT)
Dept: PHYSICAL THERAPY | Age: 64
End: 2024-04-26
Attending: INTERNAL MEDICINE
Payer: COMMERCIAL

## 2024-04-26 PROCEDURE — 97110 THERAPEUTIC EXERCISES: CPT

## 2024-04-26 NOTE — PROGRESS NOTES
Dx: Subacromial bursitis of right shoulder joint (M75.51)          Authorized # of Visits:  8  ([employer] HMO, 24 visits PT/OT/SLP 1/1-5/1/24, 8 remaining)         Next MD visit: none scheduled  Fall Risk: standard         Precautions: n/a           Date of evaluation: 4/12/24  Referring physician: Dr. Farrell  Subjective: Patient reports her shoulder is getting better. Has been doing HEP.  Rates shoulder pain 5/10 currently.     Objective:   See flow chart below      Assessment: Progressed to light weight for strengthening with good tolerance. Some limitation into R shoulder IR/ER remains, however decreased pain and improved motion noted with abduction and flexion.     Goals (to be achieved in 10 visits):    1. Patient will be independent with HEP and it's progression  2. Patient will demo R shoulder AROM at least: flex/abd 150 to be able to reach into cabinets  3. Patient will demo R shoulder strength at least 4/5 to be able to lift a small bag of groceries.  5. Patient will report decrease in symptoms by 50% or better in terms of frequency and intensity to be able to sleep without disruption from pain      Plan: Continue PT for R shoulder ROM, strengthening, HEP    Visit #2  Date: 4/16/24 Visit #3  Date: 4/26/24 Visit #4  Date:   TherEx:  PROM R shoulder all planes x 7 min  Supine cane flexion 20x  Supine cane chest press 20x  S/L ER with towel in axilla 20x  S/L abd AROM 20x  Seated scap retraction 20x5\"   Flexion wall slides 20x  Wall circles 10x CW/CCW  Narrow row with RTB 20x  Sh ext with RTB 20x  Standing flexion/abd to 90 10x ea   TherEx:  PROM R shoulder all planes x 7 min  Supine sh flex with 1# cuff weight 2x10  S/L abd with 1# cuff weight 2x10  S/L ER with towel in axilla 2x10 with 1# cuff weight  Narrow row with GTB 20x  Sh ext with GTB 20x  Standing horiz abd with RTB 20x  Standing flex/abd to 90 deg 15x ea with 1# cuff weight  SB roll up wall 20x  Wall push up 2x10   OH pulleys IR 20x - demo for HEP using  towel                 HEP:  Added supine cane flexion, S/L ER, S/L abd, flexion wall slides HEP:  Reviewed, added IR behind back with towel HEP:       Charges: TE x 3       Total Timed Treatment: 38 min  Total Treatment Time: 38 min

## 2024-05-01 ENCOUNTER — OFFICE VISIT (OUTPATIENT)
Dept: PHYSICAL THERAPY | Age: 64
End: 2024-05-01
Attending: INTERNAL MEDICINE
Payer: COMMERCIAL

## 2024-05-01 PROCEDURE — 97110 THERAPEUTIC EXERCISES: CPT

## 2024-05-01 NOTE — PROGRESS NOTES
Dx: Subacromial bursitis of right shoulder joint (M75.51)            Authorized # of Visits:  8  ([employer] HMO, 24 visits PT/OT/SLP 1/1-5/1/24, 8 remaining)         Next MD visit: none scheduled  Fall Risk: standard         Precautions: n/a           Date of evaluation: 4/12/24  Referring physician: Dr. Farrell  Subjective: Patient reports her shoulder is \"the same\", still has pain. Has been doing HEP. Rates pain 5/10 currently.     Objective:   See flow chart below      Assessment: Increased weight for RTC strengthening with good tolerance. Also initiated Scifit for ROM and strengthening with no increased pain reported, however pain with end range IR remains.     Goals (to be achieved in 10 visits):    1. Patient will be independent with HEP and it's progression  2. Patient will demo R shoulder AROM at least: flex/abd 150 to be able to reach into cabinets  3. Patient will demo R shoulder strength at least 4/5 to be able to lift a small bag of groceries.  5. Patient will report decrease in symptoms by 50% or better in terms of frequency and intensity to be able to sleep without disruption from pain      Plan: Continue PT for R shoulder ROM, strengthening, HEP    Visit #2  Date: 4/16/24 Visit #3  Date: 4/26/24 Visit #4  Date: 5/1/24   TherEx:  PROM R shoulder all planes x 7 min  Supine cane flexion 20x  Supine cane chest press 20x  S/L ER with towel in axilla 20x  S/L abd AROM 20x  Seated scap retraction 20x5\"   Flexion wall slides 20x  Wall circles 10x CW/CCW  Narrow row with RTB 20x  Sh ext with RTB 20x  Standing flexion/abd to 90 10x ea   TherEx:  PROM R shoulder all planes x 7 min  Supine sh flex with 1# cuff weight 2x10  S/L abd with 1# cuff weight 2x10  S/L ER with towel in axilla 2x10 with 1# cuff weight  Narrow row with GTB 20x  Sh ext with GTB 20x  Standing horiz abd with RTB 20x  Standing flex/abd to 90 deg 15x ea with 1# cuff weight  SB roll up wall 20x  Wall push up 2x10   OH pulleys IR 20x - demo for HEP  using towel       TherEx:  Scifit L1.0, 2'F/2'B  PROM R shoulder all planes x 5   Supine chest press 20x with 2#  Supine sh flex 20x with 2#  S/L ER with towel in axilla 20x with 2#  Standing bicep curl 20x with 2#  SB roll up wall 20x with 2# cuff weight   Wall wash flexion with 2# cuff weight 20x  OH pulleys flexion/scaption x 1 min, IR 20x  Narrow row with blue TB 20x  Sh ext with blue TB 20x   Standing horiz abd with RTB 20x  Wall push up 20x            HEP:  Added supine cane flexion, S/L ER, S/L abd, flexion wall slides HEP:  Reviewed, added IR behind back with towel HEP:  Reviewed and reinforced       Charges: TE x 3       Total Timed Treatment: 38 min  Total Treatment Time: 38 min

## 2024-05-03 ENCOUNTER — OFFICE VISIT (OUTPATIENT)
Dept: PHYSICAL THERAPY | Age: 64
End: 2024-05-03
Attending: INTERNAL MEDICINE
Payer: COMMERCIAL

## 2024-05-03 PROCEDURE — 97110 THERAPEUTIC EXERCISES: CPT

## 2024-05-03 NOTE — PROGRESS NOTES
Dx: Subacromial bursitis of right shoulder joint (M75.51)            Authorized # of Visits:  8  ([employer] HMO, 24 visits PT/OT/SLP 1/1-5/1/24, 8 remaining)           Next MD visit: none scheduled  Fall Risk: standard         Precautions: n/a           Date of evaluation: 4/12/24  Referring physician: Dr. Farrell  Subjective: Patient reports she still has pain but it is a little better. Is doing her exercises. Rates pain 4/10 currently.     Objective:   See flow chart below      Assessment: Incorporated PNF patterns to improve functional strength and increase use of RUE for ADLs. No increased pain reported post session.     Goals (to be achieved in 10 visits):    1. Patient will be independent with HEP and it's progression  2. Patient will demo R shoulder AROM at least: flex/abd 150 to be able to reach into cabinets  3. Patient will demo R shoulder strength at least 4/5 to be able to lift a small bag of groceries.  5. Patient will report decrease in symptoms by 50% or better in terms of frequency and intensity to be able to sleep without disruption from pain      Plan: Continue PT for R shoulder ROM, strengthening, HEP    Visit #2  Date: 4/16/24 Visit #3  Date: 4/26/24 Visit #4  Date: 5/1/24 Visit #5  Date: 5/3/24   TherEx:  PROM R shoulder all planes x 7 min  Supine cane flexion 20x  Supine cane chest press 20x  S/L ER with towel in axilla 20x  S/L abd AROM 20x  Seated scap retraction 20x5\"   Flexion wall slides 20x  Wall circles 10x CW/CCW  Narrow row with RTB 20x  Sh ext with RTB 20x  Standing flexion/abd to 90 10x ea   TherEx:  PROM R shoulder all planes x 7 min  Supine sh flex with 1# cuff weight 2x10  S/L abd with 1# cuff weight 2x10  S/L ER with towel in axilla 2x10 with 1# cuff weight  Narrow row with GTB 20x  Sh ext with GTB 20x  Standing horiz abd with RTB 20x  Standing flex/abd to 90 deg 15x ea with 1# cuff weight  SB roll up wall 20x  Wall push up 2x10   OH pulleys IR 20x - demo for HEP using towel        TherEx:  Scifit L1.0, 2'F/2'B  PROM R shoulder all planes x 5 min  Supine chest press 20x with 2#  Supine sh flex 20x with 2#  S/L ER with towel in axilla 20x with 2#  Standing bicep curl 20x with 2#  SB roll up wall 20x with 2# cuff weight   Wall wash flexion with 2# cuff weight 20x  OH pulleys flexion/scaption x 1 min, IR 20x  Narrow row with blue TB 20x  Sh ext with blue TB 20x   Standing horiz abd with RTB 20x  Wall push up 20x   TherEx:  Scifit L1.0, 2'F/2'B  PROM R shoulder all planes x 8 min   Supine sh flex 20x with 2#  Supine chest press 20x with 2#  Supine D1 flex/ext with 2# 10x   Supine D2 flex/ext with 2# 10x  Standing IR/ER with towel in axilla 20x ea with RTB  Standing horiz abd with RTB 20x  Standing sh ext with blue TB 20x  Wall push up 20x   OH pulleys flexion/scaption x 1 min, IR 20x  SB roll up wall with 2# cuff weight 20x  Standing flex/abd 20x ea with 2# cuff weight                   HEP:  Added supine cane flexion, S/L ER, S/L abd, flexion wall slides HEP:  Reviewed, added IR behind back with towel HEP:  Reviewed and reinforced HEP:   No updates       Charges: TE x 3       Total Timed Treatment: 38 min  Total Treatment Time: 38 min

## 2024-05-08 ENCOUNTER — OFFICE VISIT (OUTPATIENT)
Dept: PHYSICAL THERAPY | Age: 64
End: 2024-05-08
Attending: INTERNAL MEDICINE
Payer: COMMERCIAL

## 2024-05-08 PROCEDURE — 97110 THERAPEUTIC EXERCISES: CPT

## 2024-05-08 NOTE — PROGRESS NOTES
Dx: Subacromial bursitis of right shoulder joint (M75.51)            Authorized # of Visits:  10  ([employer] HMO, 29 visits PT/OT/SLP 1/1-7/30/24, 10 remaining)           Next MD visit: none scheduled  Fall Risk: standard         Precautions: n/a           Date of evaluation: 4/12/24  Referring physician: Dr. Farrell  Subjective: Patient reports her shoulder still hurts, not really getting better. Bothers her when she sleeps. Patient reports pain level 5/10 currently.     Objective:   See flow chart below      Assessment: Continued with gentle RTC strengthening through available range of motion. Patient reports decreased pain with movement by end of session.     Goals (to be achieved in 10 visits):    1. Patient will be independent with HEP and it's progression  2. Patient will demo R shoulder AROM at least: flex/abd 150 to be able to reach into cabinets  3. Patient will demo R shoulder strength at least 4/5 to be able to lift a small bag of groceries.  5. Patient will report decrease in symptoms by 50% or better in terms of frequency and intensity to be able to sleep without disruption from pain      Plan: Continue PT for R shoulder ROM, strengthening, HEP    Visit #3  Date: 4/26/24 Visit #4  Date: 5/1/24 Visit #5  Date: 5/3/24 Visit #6  Date: 5/8/24   TherEx:  PROM R shoulder all planes x 7 min  Supine sh flex with 1# cuff weight 2x10  S/L abd with 1# cuff weight 2x10  S/L ER with towel in axilla 2x10 with 1# cuff weight  Narrow row with GTB 20x  Sh ext with GTB 20x  Standing horiz abd with RTB 20x  Standing flex/abd to 90 deg 15x ea with 1# cuff weight  SB roll up wall 20x  Wall push up 2x10   OH pulleys IR 20x - demo for HEP using towel       TherEx:  Scifit L1.0, 2'F/2'B  PROM R shoulder all planes x 5 min  Supine chest press 20x with 2#  Supine sh flex 20x with 2#  S/L ER with towel in axilla 20x with 2#  Standing bicep curl 20x with 2#  SB roll up wall 20x with 2# cuff weight   Wall wash flexion with 2# cuff  weight 20x  OH pulleys flexion/scaption x 1 min, IR 20x  Narrow row with blue TB 20x  Sh ext with blue TB 20x   Standing horiz abd with RTB 20x  Wall push up 20x   TherEx:  Scifit L1.0, 2'F/2'B  PROM R shoulder all planes x 8 min   Supine sh flex 20x with 2#  Supine chest press 20x with 2#  Supine D1 flex/ext with 2# 10x   Supine D2 flex/ext with 2# 10x  Standing IR/ER with towel in axilla 20x ea with RTB  Standing horiz abd with RTB 20x  Standing sh ext with blue TB 20x  Wall push up 20x   OH pulleys flexion/scaption x 1 min, IR 20x  SB roll up wall with 2# cuff weight 20x  Standing flex/abd 20x ea with 2# cuff weight         TherEx:  Scifit L1.0, 2'F/2'B  PROM R shoulder all planes x 8 min  Supine chest press 2x10 with 2#  Supine sh flex 2x15 with 2#  S/L abd 2x15 with 2#  S/L ER with towel in axilla 20x with 2#  Standing cane IR and ext behind back 20x ea  Standing adduction from 90 with RTB 20x  Wall push up 20x   SB roll up wall with 2# cuff weight 20x          HEP:  Reviewed, added IR behind back with towel HEP:  Reviewed and reinforced HEP:   No updates        Charges: TE x 3       Total Timed Treatment: 38 min  Total Treatment Time: 38 min

## 2024-05-10 ENCOUNTER — APPOINTMENT (OUTPATIENT)
Dept: PHYSICAL THERAPY | Age: 64
End: 2024-05-10
Attending: INTERNAL MEDICINE
Payer: COMMERCIAL

## 2024-05-15 ENCOUNTER — APPOINTMENT (OUTPATIENT)
Dept: PHYSICAL THERAPY | Age: 64
End: 2024-05-15
Attending: INTERNAL MEDICINE
Payer: COMMERCIAL

## 2024-05-29 ENCOUNTER — APPOINTMENT (OUTPATIENT)
Dept: PHYSICAL THERAPY | Age: 64
End: 2024-05-29
Attending: INTERNAL MEDICINE
Payer: COMMERCIAL

## 2024-06-05 ENCOUNTER — APPOINTMENT (OUTPATIENT)
Dept: PHYSICAL THERAPY | Age: 64
End: 2024-06-05
Attending: INTERNAL MEDICINE
Payer: COMMERCIAL

## 2024-10-27 ENCOUNTER — PATIENT MESSAGE (OUTPATIENT)
Dept: ORTHOPEDICS CLINIC | Facility: CLINIC | Age: 64
End: 2024-10-27

## 2024-12-09 ENCOUNTER — OFFICE VISIT (OUTPATIENT)
Dept: ORTHOPEDICS CLINIC | Facility: CLINIC | Age: 64
End: 2024-12-09

## 2024-12-09 DIAGNOSIS — M75.51 SUBACROMIAL BURSITIS OF RIGHT SHOULDER JOINT: Primary | ICD-10-CM

## 2024-12-09 PROCEDURE — 99213 OFFICE O/P EST LOW 20 MIN: CPT | Performed by: ORTHOPAEDIC SURGERY

## 2024-12-09 RX ORDER — ALPRAZOLAM 0.5 MG
TABLET ORAL
COMMUNITY
Start: 2023-07-14

## 2024-12-09 RX ORDER — ONDANSETRON 4 MG/1
TABLET, FILM COATED ORAL
COMMUNITY

## 2024-12-09 RX ORDER — DESOXIMETASONE 2.5 MG/G
1 CREAM TOPICAL 2 TIMES DAILY
COMMUNITY
Start: 2024-11-14

## 2024-12-09 RX ORDER — VENLAFAXINE HYDROCHLORIDE 150 MG/1
150 CAPSULE, EXTENDED RELEASE ORAL DAILY
COMMUNITY
Start: 2024-03-07

## 2024-12-09 RX ORDER — SITAGLIPTIN 100 MG/1
100 TABLET, FILM COATED ORAL DAILY
COMMUNITY
Start: 2024-10-14

## 2024-12-09 RX ORDER — CLONIDINE HYDROCHLORIDE 0.1 MG/1
0.1 TABLET ORAL 2 TIMES DAILY
COMMUNITY
Start: 2024-10-14

## 2024-12-09 RX ORDER — MINOXIDIL 2.5 MG/1
2.5 TABLET ORAL 2 TIMES DAILY
COMMUNITY
Start: 2024-11-14 | End: 2026-02-07

## 2024-12-09 RX ORDER — HYDROXYZINE HYDROCHLORIDE 10 MG/1
10 TABLET, FILM COATED ORAL
COMMUNITY
Start: 2024-03-07

## 2024-12-09 RX ORDER — NITROGLYCERIN 0.4 MG/1
0.4 TABLET SUBLINGUAL AS NEEDED
COMMUNITY

## 2024-12-09 NOTE — PROGRESS NOTES
NURSING INTAKE COMMENTS:   Chief Complaint   Patient presents with    Shoulder Pain     F/u right shoulder pain 8/10 worse on ROM, pain travels to hand. Got cortisone injection on 03/25/2024 pain was decreased for about 3 days.  Has decreased ROM on right hand Hx of strokes.  is present at this visit.       HPI: This 64 year old female presents today with complaints of pain of the right shoulder with some radiation down the arm to the hand.  Her history is notable for previous stroke with right sided upper extremity paracystic.  She cannot use her hand since the stroke.  She reports that there was some relief of symptoms following cortisone injection I gave her in March, but it lasted only for a few days.  She reports that she did the physical therapy and it helped somewhat.  I do not see any therapy notes in her chart, but it is possible she did therapy at an outside facility.  She is a somewhat poor historian.    Past Medical History:    Essential hypertension     History reviewed. No pertinent surgical history.  Current Outpatient Medications   Medication Sig Dispense Refill    ALPRAZolam 0.5 MG Oral Tab TAKE 1/2 TO 1 TABLET BY MOUTH AS NEEDED AT BEDTIME      cloNIDine 0.1 MG Oral Tab Take 1 tablet (0.1 mg total) by mouth 2 (two) times daily.      Desoximetasone 0.25 % External Cream Apply 1 Application topically 2 (two) times daily.      hydrOXYzine 10 MG Oral Tab Take 1 tablet (10 mg total) by mouth.      minoxidil 2.5 MG Oral Tab Take 1 tablet (2.5 mg total) by mouth 2 (two) times daily.      nitroglycerin 0.4 MG Sublingual SL Tab Place 1 tablet (0.4 mg total) under the tongue as needed for Chest pain.      ondansetron (ZOFRAN) 4 mg tablet Take 1 TabLET by mouth twice daily as needed for For Nausea/Vomiting      JANUVIA 100 MG Oral Tab Take 1 tablet (100 mg total) by mouth daily.      venlafaxine  MG Oral Capsule SR 24 Hr Take 1 capsule (150 mg total) by mouth daily.      celecoxib 200 MG Oral Cap  Take 1 capsule (200 mg total) by mouth daily.      fexofenadine 180 MG Oral Tab Take 1 tablet (180 mg total) by mouth daily.      gabapentin 100 MG Oral Cap One in AM and 2 at bed time      amLODIPine 10 MG Oral Tab Take 1 tablet (10 mg total) by mouth daily. 30 tablet 0    aspirin 81 MG Oral Tab EC Take 1 tablet (81 mg total) by mouth daily. 30 tablet 0    atorvastatin 40 MG Oral Tab Take 1 tablet (40 mg total) by mouth nightly. 30 tablet 0    clopidogrel 75 MG Oral Tab Take 1 tablet (75 mg total) by mouth daily. 30 tablet 0    losartan 100 MG Oral Tab Take 1 tablet (100 mg total) by mouth daily. 30 tablet 0     Allergies[1]  History reviewed. No pertinent family history.    Social History     Occupational History    Not on file   Tobacco Use    Smoking status: Never    Smokeless tobacco: Never   Vaping Use    Vaping status: Never Used   Substance and Sexual Activity    Alcohol use: Never    Drug use: Never    Sexual activity: Not on file        Review of Systems:  GENERAL: feels generally well, no significant weight loss or weight gain  SKIN: no ulcerated or worrisome skin lesions  EYES:denies blurred vision or double vision  HEENT: denies new nasal congestion, sinus pain or ST  LUNGS: denies shortness of breath  CARDIOVASCULAR: denies chest pain  GI: no hematemesis, no worsening heartburn, no diarrhea  : no dysuria, no blood in urine, no difficulty urinating, no incontinence  MUSCULOSKELETAL: no other musculoskeletal complaints other than in HPI  NEURO: no numbness or tingling, no weakness or balance disorder  PSYCHE: no depression or anxiety  HEMATOLOGIC: no hx of blood dyscrasia  ENDOCRINE: no thyroid or diabetes issues  ALL/ASTHMA: no new hx of severe allergy or asthma    Physical Examination:    There were no vitals taken for this visit.  Constitutional: appears well hydrated, alert and responsive, no acute distress noted  Extremities: Dyssynchronous motion of the right shoulder secondary to hemiparesis.   Positive impingement sign.      Imaging: No results found.     Lab Results   Component Value Date    WBC 4.5 01/05/2024    HGB 10.4 (L) 01/05/2024    .0 01/05/2024      Lab Results   Component Value Date    GLU 88 01/05/2024    BUN 6 (L) 01/05/2024    CREATSERUM 0.55 01/05/2024        Assessment and Plan:  Diagnoses and all orders for this visit:    Subacromial bursitis of right shoulder joint  -     MRI SHOULDER, RIGHT (CPT=73221); Future        Assessment: She has persistent pain in the right shoulder radiating down the arm to the hand.  There may be an element of subacromial bursitis or rotator cuff pathology contributing to this.  Her clinical picture is complicated by her previous stroke with a right upper extremity hemiparesis.  The differential diagnosis also includes cervical radiculopathy, or pain following her stroke.    Plan: I did an MRI scan of the shoulder, advised her to continue doing exercises she learned in physical therapy, and I will follow-up with her after the MRI has been completed.    The above note was creating using Dragon speech recognition technology. Please excuse any typos.    MART HODGES MD       [1]   Allergies  Allergen Reactions    Semaglutide NAUSEA AND VOMITING    Amlodipine OTHER (SEE COMMENTS)     CNS effects    Glipizide OTHER (SEE COMMENTS)    Naproxen UNKNOWN    Sumatriptan UNKNOWN    Treximet UNKNOWN

## 2025-02-17 ENCOUNTER — HOSPITAL ENCOUNTER (OUTPATIENT)
Dept: MRI IMAGING | Facility: HOSPITAL | Age: 65
Discharge: HOME | End: 2025-02-17
Attending: ORTHOPAEDIC SURGERY
Payer: COMMERCIAL

## 2025-02-17 DIAGNOSIS — M75.51 SUBACROMIAL BURSITIS OF RIGHT SHOULDER JOINT: ICD-10-CM

## 2025-02-17 PROCEDURE — 73221 MRI JOINT UPR EXTREM W/O DYE: CPT | Performed by: ORTHOPAEDIC SURGERY

## 2025-04-28 ENCOUNTER — HOSPITAL ENCOUNTER (OUTPATIENT)
Dept: BONE DENSITY | Age: 65
Discharge: HOME | End: 2025-04-28
Attending: INTERNAL MEDICINE
Payer: COMMERCIAL

## 2025-04-28 DIAGNOSIS — Z13.820 SCREENING FOR OSTEOPOROSIS: ICD-10-CM

## 2025-04-28 PROCEDURE — 77080 DXA BONE DENSITY AXIAL: CPT | Performed by: INTERNAL MEDICINE

## (undated) NOTE — LETTER
AUTHORIZATION FOR SURGICAL OPERATION OR OTHER PROCEDURE    1. I hereby authorize Dr. Perdomo, and [facility] staff assigned to my case to perform the following operation and/or procedure at the [facility] site:  Right shoulder cortisone injection   _______________________________________________________________________________________________      _______________________________________________________________________________________________    2.  My physician has explained the nature and purpose of the operation or other procedure, possible alternative methods of treatment, the risks involved, and the possibility of complication to me.  I acknowledge that no guarantee has been made as to the result that may be obtained.  3.  I recognize that, during the course of this operation, or other procedure, unforseen conditions may necessitate additional or different procedure than those listed above.  I, therefore, further authorize and request that the above named physician, his/her physician assistants or designees perform such procedures as are, in his/her professional opinion, necessary and desirable.  4.  Any tissue or organs removed in the operation or other procedure may be disposed of by and at the discretion of the [facility].  5.  I understand that in the event of a medical emergency, I will be transported by local paramedics to [facility] or other hospital emergency department.  6.  I certify that I have read and fully understand the above consent to operation and/or other procedure.    7.  I acknowledge that my physician has explained sedation/analgesia administration to me including the risks and benefits.  I consent to the administration of sedation/analgesia as may be necessary or desirable in the judgement of my physician.    Witness signature: ___________________________________________________ Date:   ______/______/_____                    Time:  ________ A.M.  P.M.       Patient Name:  ______________________________________________________  (please print)      Patient signature:  ___________________________________________________             Relationship to Patient:           []  Parent    Responsible person                          []  Spouse  In case of minor or                    [] Other  _____________   Incompetent name:  __________________________________________________                               (please print)      _____________      Responsible person  In case of minor or  Incompetent signature:  _______________________________________________    Statement of Physician  My signature below affirms that prior to the time of the procedure, I have explained to the patient and/or his/her guardian, the risks and benefits involved in the proposed treatment and any reasonable alternative to the proposed treatment.  I have also explained the risks and benefits involved in the refusal of the proposed treatment and have answered the patient's questions.                        Date:  ______/______/_______  Provider                      Signature:  __________________________________________________________       Time:  ___________ A.M    P.M.

## (undated) NOTE — LETTER
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  She was hospitalized in [facility] and was discharged home without PT, OT or speech therapy.  She eventually was referred for outpatient therapy.  She is currently being seen by OT.      She is not sure whether she is having trouble thinking of the words or saying the words.  Her speech is the same in Lebanese and English. She lives with her  and adult son. She was working as a  prior to her stroke.  Family bill paying and finances are handled by her .      The patient went through high school in [facility] [facility] and came to the [place] when a teenager.    Incident/Injury: CVA 6/2023  Pain: 6-7/10, arm shoulder, elbow  Hospital/Rehab course: Pt was discharged home from [facility] and received no therapy until now.  She is currently enrolled in speech and occupational therapies  Current functional limitations include:  Pt is unable to communicate and use her hand at work, so is unable to work  Mitch describes prior level of function independent. Pt goals include \"I want a good... talking good.\"  Past medical history was reviewed with Mitch. Significant findings include DM, HTN.    ASSESSMENT  Mitch presents with mild-moderate mixed expressive and receptive aphasia. The  patient initially refused Language Line , but during receptive language testing I strongly encouraged and she agreed.  Language Line Bulgarian  Sherry, ID# [id number] assisted.   Pt and SLP discussed evaluation findings, pathology, POC and HEP.  Pt voiced understanding. Skilled Speech Therapy is medically necessary to address the above impairments and reach functional goals.     OBJECTIVE:   Assessment tools used: [place] Diagnostic Aphasia Examination (BDAE)  APHASIA/LANGUAGE DISORDER:  Severity: Moderate  Type: mixed  Verbal expression: Moderately impaired.  Pt's expressive language is telegraphic. Responsive naming 80%.  Generative naming. 9/min  Auditory Comprehension: Mildly impaired.  Complex directions 80% in English.  Complex yes/no 100% in Bulgarian, 25% English.  Paragraphs 75% in Bulgarian (not presented in English).    Written Expression: WNL.   bio info 100%.  Writing simple sentence in Bulgarian 100%.  Reading Comprehension: Not assessed.  Pt reports she only reads the mail and does no other reading.        COGNITIVE-COMMUNICATIVE SKILLS  Not assessed.  Pt refused  multiple times even though she did not fully understand the stimuli presented.  Subtle cognitive deficits suspected.      ORAL MOTOR MECHANISM  Facial and Oral Structure/Apperance: right facial droop  Not assessed.    SPEECH PRODUCTION DEFICITS  Severity: WNL/WFL  Speech is slow, but articulation intact with intelligibility 90-95%.  Reduced intelligibility due to language/accent difference rather than speech impairment.  Slow rate is likely due to verbal expression deficits rather than dysarthria.      Today's Treatment:  Pt education was provided on exam findings, treatment diagnosis, treatment plan, expectations, and prognosis. HEP was not issued due to time constraints.    Charges: Eval x1, 17120          Total Treatment Time: 45 min     PLAN OF CARE:    Goals: (to be met in 16 visits)   Pt will improve  generative naming to 12 items/minute.  Pt will produce sentences given picture stim or wh- question with 90% accuracy.  Pt will communicate wants, needs and recent events with 90% accuracy in sentences.  Pt will participate in oral [facility] exam to assess ROM and strength.  Pt will participate in cognitive assessment as warranted.    Frequency / Duration: Patient will be seen for 1-2 x/week or a total of 16 visits over a 90 day period. Treatment will include: Speech Therapy    Education or treatment limitation: Cognition, Communication, and language  Rehab Potential:good      Patient was advised of these findings, precautions, and treatment options and has agreed to actively participate in planning and for this course of care.    Thank you for your referral. Please co-sign or sign and return this letter via fax as soon as possible to [phone]. If you have any questions, please contact me at Dept: [phone]    Sincerely,  Electronically signed by therapist: Taylor Powers, SLP  Physician's certification required: Yes  I certify the need for these services furnished under this plan of treatment and while under my care.    X___________________________________________________ Date____________________    Certification From: 2/2/2024  To:5/2/2024      21st Century Cures Act Notice to Patient: Medical documents like this are made available to patients in the interest of transparency. However, be advised this is a medical document and it is intended as [id number] communication between your medical providers. This medical document may contain abbreviations, assessments, medical data, and results or other terms that are unfamiliar. Medical documents are intended to carry relevant information, facts as evident, and the clinical opinion of the practitioner. As such, this medical document may be written in language that appears blunt or direct. You are encouraged to contact your medical provider and/or [facility] Patient Experience if you have any questions about this medical document.

## (undated) NOTE — LETTER
Patient Name: Mitch Wick  YOB: 1960          MRN number:  [id number]  Date:  1/16/2024  Referring Physician:  Montana David         OCCUPATIONAL THERAPY NEUROLOGICAL EVALUATION:.     Diagnosis:   Right arm weakness (R29.898)  History of ischemic cerebrovascular accident with residual deficit (I69.30)      Referring Provider: Joann  Date of Evaluation:    1/16/2024    Precautions:   Hx of CVA Next MD visit:   none scheduled  Date of Surgery: n/a     PATIENT SUMMARY   Serbian  ID: [id number]    Mitch Wick is a 64 year old female who presents to therapy today with a hx of CVA in June 2023. History difficult to obtain d/t aphasia and language barrier. Per pt, pt's  took pt to the ER after he noticed stroke like symptoms at home and pt was hospitalized for a few days. Pt returned home to recover and symptoms persisted into the following months therefore she met with her PCP who recommended rehab including occupational and speech therapy. Pt is set to be evaluated for speech in the next couple weeks. Pt reports post CVA, pt has had difficulty with RUE, vision, and speech.  Living Situation: Live with  and adult son  Dominant Hand: R hand  Pt describes pain level: current 7/10 in R hand and elbow    Imaging:   Per hospital discharge follow-up note on 06/08/23 signed by Montana David MD:   -head CT revealed small acute infarction within the left centrum semiovale. Chronic bilateral thalamic lacunar infarctions. Small chronic white matter infarction in the right cerebellum. HEAD CTA with significant intracranial stenoses involving the anterior and posterior circulation. Neg carotid Doppler, 2 D eccho. Brain MRI with acute nonhemorrhagic lacunar infarctions within the left centrum semiovale and right caudate head, superimposed on a background of severe chronic stable small vessel disease. No intracranial hemorrhage.     Current functional limitations include independent.  Mitch describes  prior level of function assistance from  for dressing and cooking. Pt performs grooming, toileting, and bathing with one hand. Pt goals include gain pain free functioning in her RUE.  Past medical history was reviewed with Mitch. Significant findings include  has a past medical history of Essential hypertension.     ASSESSMENT  Mitch presents to occupational therapy evaluation with primary c/o RUE weakness, lack of AROM, and pain 2/2 CVA. Pt's stroke occurred in June of 2023. Pt has not been seen for any skilled rehabilitation in the past. Pt was a poor historian with difficulties in speech d/t aphasia. Pt required extended time to obtain occupational profile. Pt has been using LUE for all adls as pt is unable to make a fist with RUE or demo pincer grasp. Pt's  provides assistance with dressing and iadls, as well as, driving. The results of the objective tests and measures show deficits in R shoulder, elbow, and hand AROM, RUE strength, RUE coordination and motor planning.  Functional deficits include but are not limited to compensating for all adls with LUE with  aiding in dressing and iadls. Signs and symptoms are consistent with diagnosis of Right arm weakness and  History of ischemic cerebrovascular accident with residual deficit . Pt and OT discussed evaluation findings, pathology, and POC, pt voiced understanding. Skilled Occupational Therapy is medically necessary to address the above impairments and reach functional goals.    OBJECTIVE   Observation: delayed motor planning, inability to grasp and transport items within R hand    Cognition:  delayed speech    Vision:  unable to close Left eye however able to track target     Sensory: Tingling: Yes, in R hand    Coordination:   Finger to Nose: mod deficits, ataxic motor movements noted, undershooting observed   Rapid Alternating Movements: max deficits, delayed coordination   9 Hole Peg Test: unable    Orthotics: none    Edema:  none    ROM: WNL CAROLYN UE except below  Shoulder  Elbow   Flexion: R 78; L 145  Abduction: R 85; L 152   Flexion: R 100; L 138       AROM:(Degrees)  RIGHT HAND:    Thumb IF MF RF SF   MP 0/40 0/15 0/14 0/0 0/0   PIP 0/14 0/55 0/50 -45/65 0/0   DIP  0/0 0/24 0/0 0/0   ROMERO 54 70 88 20 0       STRENGTH/MMT: 5/5 CAROLYN UE except below:  Shoulder Elbow Wrist   Flexion: R 3-/5; L 4+/5  Abduction: R 3-/5; L 5/5   Flexion: R 3-/5; L 5/5  Extension: R 3-/5; L 5/5   Flexion: R 3+/5, L 5/5  Extension: R 3+/5, L 5/5       Strength (lbs) Right Average Left Average   : Unable 42   2 pt Pinch: unable 5   3 pt Pinch: unable 6   Lateral Pinch: unable 5       Today's Treatment and Response:   Pt education was provided on exam findings, treatment diagnosis, treatment plan, expectations, and prognosis.Patient was instructed in and issued a HEP for: unable to establish d/t time constraints and extended time needed for occupational profile    Charges: OT Eval Moderate Complexity,       Total Timed Treatment: 0 min     Total Treatment Time: 45 min     Based on clinical rationale and outcome measures, this evaluation involved Moderate Complexity decision making due to 3+ personal factors/comorbidities, 3 body structures involved/activity limitations, and evolving symptoms including changing pain levels and decline in functional independence .  PLAN OF CARE:    Goals: (to be met in 12 visits)  Pt complaints of pain in right shoulder will decrease at worst to 1/10.  Pt will be modified independent and compliant with comprehensive HEP to maintain progress achieved in OT.  Pt will demo R handed pincer grasp to transport 10 items within 3/4 trials.  Pt will increase R shoulder flexion and abduction to 100 degrees for ease of dressing.  Pt will decrease her QuickDASH score by 10 points to demo improved functional independence.      Frequency / Duration: Patient will be seen for 2x/week or a total of 12 visits over a 90 day period.  Treatment will  include: Neuromuscular Re-education, Manual Therapy, Therapeutic Exercise, Therapeutic Activity, Electrical Stim, Splinting, Pt education, Home exercise program, joint protection techniques, and activity modifications     Education or treatment limitation: Communication,  used  Rehab Potential:good    QuickDASH Outcome Score  Score: 81.82 % (1/15/2024  4:58 PM)      Patient/Family/Caregiver was advised of these findings, precautions, and treatment options and has agreed to actively participate in planning and for this course of care.    Thank you for your referral. Please co-sign or sign and return this letter via fax as soon as possible to [phone]. If you have any questions, please contact me at Dept: [phone]    Sincerely,  Electronically signed by therapist: Sonali Flores MS, OTR/L   Physician's certification required: Yes  I certify the need for these services furnished under this plan of treatment and while under my care.    X___________________________________________________ Date____________________    Certification From: 1/16/2024  To:4/15/2024      21st Century Cures Act Notice to Patient: Medical documents like this are made available to patients in the interest of transparency. However, be advised this is a medical document and it is intended as [id number] communication between your medical providers. This medical document may contain abbreviations, assessments, medical data, and results or other terms that are unfamiliar. Medical documents are intended to carry relevant information, facts as evident, and the clinical opinion of the practitioner. As such, this medical document may be written in language that appears blunt or direct. You are encouraged to contact your medical provider and/or [facility] Patient Experience if you have any questions about this medical document.